# Patient Record
Sex: FEMALE | Race: WHITE | Employment: OTHER | URBAN - METROPOLITAN AREA
[De-identification: names, ages, dates, MRNs, and addresses within clinical notes are randomized per-mention and may not be internally consistent; named-entity substitution may affect disease eponyms.]

---

## 2017-01-19 PROBLEM — R60.0 LOCALIZED EDEMA: Status: ACTIVE | Noted: 2017-01-19

## 2017-01-19 PROBLEM — R07.2 PRECORDIAL PAIN: Status: ACTIVE | Noted: 2017-01-19

## 2017-11-03 PROBLEM — E11.9 DIABETES MELLITUS WITHOUT COMPLICATION (HCC): Status: ACTIVE | Noted: 2017-11-03

## 2018-05-03 PROBLEM — E11.21 TYPE 2 DIABETES WITH NEPHROPATHY (HCC): Status: ACTIVE | Noted: 2018-05-03

## 2018-07-08 ENCOUNTER — APPOINTMENT (OUTPATIENT)
Dept: GENERAL RADIOLOGY | Age: 83
End: 2018-07-08
Attending: EMERGENCY MEDICINE
Payer: MEDICARE

## 2018-07-08 ENCOUNTER — HOSPITAL ENCOUNTER (EMERGENCY)
Age: 83
Discharge: HOME | End: 2018-07-08
Attending: EMERGENCY MEDICINE
Payer: MEDICARE

## 2018-07-08 VITALS
DIASTOLIC BLOOD PRESSURE: 84 MMHG | HEIGHT: 62 IN | WEIGHT: 130 LBS | HEART RATE: 73 BPM | BODY MASS INDEX: 23.92 KG/M2 | TEMPERATURE: 98 F | RESPIRATION RATE: 16 BRPM | SYSTOLIC BLOOD PRESSURE: 125 MMHG | OXYGEN SATURATION: 100 %

## 2018-07-08 DIAGNOSIS — K59.00 CONSTIPATION, UNSPECIFIED CONSTIPATION TYPE: Primary | ICD-10-CM

## 2018-07-08 DIAGNOSIS — J20.9 ACUTE BRONCHITIS, UNSPECIFIED ORGANISM: ICD-10-CM

## 2018-07-08 LAB
ANION GAP SERPL CALC-SCNC: 10 MMOL/L (ref 7–16)
BUN SERPL-MCNC: 16 MG/DL (ref 8–23)
CALCIUM SERPL-MCNC: 9.5 MG/DL (ref 8.3–10.4)
CHLORIDE SERPL-SCNC: 97 MMOL/L (ref 98–107)
CO2 SERPL-SCNC: 28 MMOL/L (ref 21–32)
CREAT SERPL-MCNC: 0.93 MG/DL (ref 0.6–1)
ERYTHROCYTE [DISTWIDTH] IN BLOOD BY AUTOMATED COUNT: 12.2 % (ref 11.9–14.6)
GLUCOSE SERPL-MCNC: 77 MG/DL (ref 65–100)
HCT VFR BLD AUTO: 31.9 % (ref 35.8–46.3)
HGB BLD-MCNC: 10.8 G/DL (ref 11.7–15.4)
MCH RBC QN AUTO: 30.4 PG (ref 26.1–32.9)
MCHC RBC AUTO-ENTMCNC: 33.9 G/DL (ref 31.4–35)
MCV RBC AUTO: 89.9 FL (ref 79.6–97.8)
PLATELET # BLD AUTO: 251 K/UL (ref 150–450)
PMV BLD AUTO: 9.7 FL (ref 10.8–14.1)
POTASSIUM SERPL-SCNC: 3.3 MMOL/L (ref 3.5–5.1)
RBC # BLD AUTO: 3.55 M/UL (ref 4.05–5.25)
SODIUM SERPL-SCNC: 135 MMOL/L (ref 136–145)
WBC # BLD AUTO: 6.7 K/UL (ref 4.3–11.1)

## 2018-07-08 PROCEDURE — 71046 X-RAY EXAM CHEST 2 VIEWS: CPT

## 2018-07-08 PROCEDURE — 99283 EMERGENCY DEPT VISIT LOW MDM: CPT | Performed by: EMERGENCY MEDICINE

## 2018-07-08 PROCEDURE — 74019 RADEX ABDOMEN 2 VIEWS: CPT

## 2018-07-08 PROCEDURE — 80048 BASIC METABOLIC PNL TOTAL CA: CPT | Performed by: EMERGENCY MEDICINE

## 2018-07-08 PROCEDURE — 85027 COMPLETE CBC AUTOMATED: CPT | Performed by: EMERGENCY MEDICINE

## 2018-07-08 RX ORDER — DOXYCYCLINE HYCLATE 100 MG
100 TABLET ORAL 2 TIMES DAILY
Qty: 20 TAB | Refills: 0 | Status: SHIPPED | OUTPATIENT
Start: 2018-07-08 | End: 2018-07-18

## 2018-07-08 RX ORDER — DOXYCYCLINE HYCLATE 100 MG
100 TABLET ORAL 2 TIMES DAILY
Qty: 20 TAB | Refills: 0 | Status: SHIPPED | OUTPATIENT
Start: 2018-07-08 | End: 2018-07-08

## 2018-07-08 RX ORDER — POLYETHYLENE GLYCOL 3350 17 G/17G
17 POWDER, FOR SOLUTION ORAL DAILY
Qty: 238 G | Refills: 0 | Status: SHIPPED | OUTPATIENT
Start: 2018-07-08 | End: 2018-07-08

## 2018-07-08 RX ORDER — POLYETHYLENE GLYCOL 3350 17 G/17G
17 POWDER, FOR SOLUTION ORAL DAILY
Qty: 238 G | Refills: 0 | Status: SHIPPED | OUTPATIENT
Start: 2018-07-08 | End: 2019-01-15

## 2018-07-08 NOTE — ED TRIAGE NOTES
Patient states she has been taking laxatives all week due to constipation and has just been passing \"water\" with no \"real bowel movements\". Patient states she had a small bowel movement one day this week but is unable to remember what day.

## 2018-07-08 NOTE — ED PROVIDER NOTES
HPI Comments: 80-year-old female presents with complaints of constipation. States he is taking laxatives over the last 2 weeks has had some watery bowel movements but no normal bowel movements. She also intermingled complaints of hemoptysis. The hemoptysis seemed to happen just last night. Patient is a nonsmoker. States she's never had issues with hemoptysis in the past.    Patient is a 80 y.o. female presenting with constipation. The history is provided by the patient and the spouse. Constipation    This is a recurrent problem. The current episode started more than 1 week ago. The stool is described as watery. Associated symptoms include abdominal pain, flatus, diarrhea and constipation. Pertinent negatives include no dysuria, no abdominal distention, no chills, no fever, no back pain and no vomiting. She has tried oral laxatives for the symptoms. The treatment provided mild relief. Her past medical history does not include neuromuscular disease, irritable bowel syndrome or endocrine disease. Past Medical History:   Diagnosis Date    Hypertension     Thyroid disease        Past Surgical History:   Procedure Laterality Date    HX ORTHOPAEDIC      right hip    HX OTHER SURGICAL      lung tumor         Family History:   Problem Relation Age of Onset    Heart Disease Mother     Heart Disease Father        Social History     Social History    Marital status:      Spouse name: N/A    Number of children: N/A    Years of education: N/A     Occupational History    Not on file. Social History Main Topics    Smoking status: Never Smoker    Smokeless tobacco: Never Used    Alcohol use No    Drug use: No    Sexual activity: Not on file     Other Topics Concern    Not on file     Social History Narrative         ALLERGIES: Amoxicillin; Penicillins; and Sulfa (sulfonamide antibiotics)    Review of Systems   Constitutional: Negative for chills and fever.    HENT: Negative for congestion, ear pain and rhinorrhea. Eyes: Negative for photophobia and discharge. Respiratory: Positive for cough. Negative for shortness of breath. Cardiovascular: Negative for chest pain and palpitations. Gastrointestinal: Positive for abdominal pain, constipation, diarrhea and flatus. Negative for abdominal distention and vomiting. Endocrine: Negative for cold intolerance and heat intolerance. Genitourinary: Negative for dysuria and flank pain. Musculoskeletal: Negative for arthralgias, back pain, myalgias and neck pain. Skin: Negative for rash and wound. Allergic/Immunologic: Negative for environmental allergies and food allergies. Neurological: Negative for syncope and headaches. Hematological: Negative for adenopathy. Does not bruise/bleed easily. Psychiatric/Behavioral: Negative for dysphoric mood. The patient is not nervous/anxious. All other systems reviewed and are negative. Vitals:    07/08/18 0807   BP: 153/77   Pulse: 75   Resp: 16   Temp: 97.8 °F (36.6 °C)   SpO2: 99%   Weight: 59 kg (130 lb)   Height: 5' 1.5\" (1.562 m)            Physical Exam   Constitutional: She is oriented to person, place, and time. She appears well-developed and well-nourished. She appears distressed. HENT:   Head: Normocephalic and atraumatic. Mouth/Throat: Oropharynx is clear and moist.   Eyes: Conjunctivae and EOM are normal. Pupils are equal, round, and reactive to light. Right eye exhibits no discharge. Left eye exhibits no discharge. No scleral icterus. Neck: Normal range of motion. Neck supple. No thyromegaly present. Cardiovascular: Normal rate, regular rhythm, normal heart sounds and intact distal pulses. Exam reveals no gallop and no friction rub. No murmur heard. Pulmonary/Chest: Effort normal and breath sounds normal. No respiratory distress. She has no wheezes. She exhibits no tenderness. Abdominal: Soft. Bowel sounds are normal. She exhibits no distension.  There is no hepatosplenomegaly. There is no tenderness. There is no rebound and no guarding. No hernia. Musculoskeletal: Normal range of motion. She exhibits no edema or tenderness. Neurological: She is alert and oriented to person, place, and time. No cranial nerve deficit. She exhibits normal muscle tone. Patient is somewhat tangential in her speech. Will often  Not directly answer the question that is posed to her   Skin: Skin is warm and dry. No rash noted. She is not diaphoretic. No erythema. Psychiatric: Her speech is normal and behavior is normal. Judgment and thought content normal. Her mood appears anxious. She exhibits abnormal recent memory. Nursing note and vitals reviewed. MDM  Number of Diagnoses or Management Options  Acute bronchitis, unspecified organism: new and requires workup  Constipation, unspecified constipation type: new and requires workup  Diagnosis management comments: 80-year-old female presents with concerns over constipation. Intermittent over 2 weekshas had flat S is well as some loose watery stools. Also complains of hemoptysis. Single episode occurred last night without recurrence. No fever nonsmoker    At this point, We'll check a CBC to make sure her blood counts are okay. Check chest x-ray to rule out infectious etiology or evidence for malignancy  We'll check abdominal films for evidence of constipation, obstruction or free air       Amount and/or Complexity of Data Reviewed  Clinical lab tests: ordered and reviewed  Tests in the radiology section of CPT®: ordered and reviewed  Tests in the medicine section of CPT®: ordered  Review and summarize past medical records: yes    Risk of Complications, Morbidity, and/or Mortality  Presenting problems: moderate  Diagnostic procedures: moderate  Management options: low  General comments: Elements of this note have been dictated via voice recognition software.   Text and phrases may be limited by the accuracy of the software. The chart has been reviewed, but errors may still be present.       Patient Progress  Patient progress: stable        ED Course       Procedures

## 2018-07-08 NOTE — ED NOTES
I have reviewed discharge instructions with the patient. The patient verbalized understanding. Patient left ED via Discharge Method: wheelchair to Home with  . Opportunity for questions and clarification provided. Patient given 2 scripts. To continue your aftercare when you leave the hospital, you may receive an automated call from our care team to check in on how you are doing. This is a free service and part of our promise to provide the best care and service to meet your aftercare needs.  If you have questions, or wish to unsubscribe from this service please call [phone]. Thank you for Choosing our [facility] Emergency Department.

## 2018-07-08 NOTE — DISCHARGE INSTRUCTIONS
Bronchitis: Care Instructions  Your Care Instructions    Bronchitis is inflammation of the bronchial tubes, which carry air to the lungs. The tubes swell and produce mucus, or phlegm. The mucus and inflamed bronchial tubes make you cough. You may have trouble breathing. Most cases of bronchitis are caused by viruses like those that cause colds. Antibiotics usually do not help and they may be harmful. Bronchitis usually develops rapidly and lasts about 2 to 3 weeks in otherwise healthy people. Follow-up care is a key part of your treatment and safety. Be sure to make and go to all appointments, and call your doctor if you are having problems. It's also a good idea to know your test results and keep a list of the medicines you take. How can you care for yourself at home? · Take all medicines exactly as prescribed. Call your doctor if you think you are having a problem with your medicine. · Get some extra rest.  · Take an over-the-counter pain medicine, such as acetaminophen (Tylenol), ibuprofen (Advil, Motrin), or naproxen (Aleve) to reduce fever and relieve body aches. Read and follow all instructions on the label. · Do not take two or more pain medicines at the same time unless the doctor told you to. Many pain medicines have acetaminophen, which is Tylenol. Too much acetaminophen (Tylenol) can be harmful. · Take an over-the-counter cough medicine that contains dextromethorphan to help quiet a dry, hacking cough so that you can sleep. Avoid cough medicines that have more than one active ingredient. Read and follow all instructions on the label. · Breathe moist air from a humidifier, hot shower, or sink filled with hot water. The heat and moisture will thin mucus so you can cough it out. · Do not smoke. Smoking can make bronchitis worse. If you need help quitting, talk to your doctor about stop-smoking programs and medicines. These can increase your chances of quitting for good.   When should you call for help? Call 911 anytime you think you may need emergency care. For example, call if:  ? · You have severe trouble breathing. ?Call your doctor now or seek immediate medical care if:  ? · You have new or worse trouble breathing. ? · You cough up dark brown or bloody mucus (sputum). ? · You have a new or higher fever. ? · You have a new rash. ? Watch closely for changes in your health, and be sure to contact your doctor if:  ? · You cough more deeply or more often, especially if you notice more mucus or a change in the color of your mucus. ? · You are not getting better as expected. Where can you learn more? Go to http://jonatan-carlo.info/. Enter H333 in the search box to learn more about \"Bronchitis: Care Instructions. \"  Current as of: May 12, 2017  Content Version: 11.4  © 0769-5937 [employer]. Care instructions adapted under license by [employer] (which disclaims liability or warranty for this information). If you have questions about a medical condition or this instruction, always ask your healthcare professional. [place] [address] any warranty or liability for your use of this information. Constipation: Care Instructions  Your Care Instructions    Constipation means that you have a hard time passing stools (bowel movements). People pass stools from 3 times a day to once every 3 days. What is normal for you may be different. Constipation may occur with pain in the rectum and cramping. The pain may get worse when you try to pass stools. Sometimes there are small amounts of bright red blood on toilet paper or the surface of stools. This is because of enlarged veins near the rectum (hemorrhoids). A few changes in your diet and lifestyle may help you avoid ongoing constipation. Your doctor may also prescribe medicine to help loosen your stool. Some medicines can cause constipation.  These include pain medicines and antidepressants. Tell your doctor about all the medicines you take. Your doctor may want to make a medicine change to ease your symptoms. Follow-up care is a key part of your treatment and safety. Be sure to make and go to all appointments, and call your doctor if you are having problems. It's also a good idea to know your test results and keep a list of the medicines you take. How can you care for yourself at home? · Drink plenty of fluids, enough so that your urine is light yellow or clear like water. If you have kidney, heart, or liver disease and have to limit fluids, talk with your doctor before you increase the amount of fluids you drink. · Include high-fiber foods in your diet each day. These include fruits, vegetables, beans, and whole grains. · Get at least 30 minutes of exercise on most days of the week. Walking is a good choice. You also may want to do other activities, such as running, swimming, cycling, or playing tennis or team sports. · Take a fiber supplement, such as Citrucel or Metamucil, every day. Read and follow all instructions on the label. · Schedule time each day for a bowel movement. A daily routine may help. Take your time having your bowel movement. · Support your feet with a small step stool when you sit on the toilet. This helps flex your hips and places your pelvis in a squatting position. · Your doctor may recommend an over-the-counter laxative to relieve your constipation. Examples are Milk of Magnesia and MiraLax. Read and follow all instructions on the label. Do not use laxatives on a long-term basis. When should you call for help? Call your doctor now or seek immediate medical care if:  ? · You have new or worse belly pain. ? · You have new or worse nausea or vomiting. ? · You have blood in your stools. ? Watch closely for changes in your health, and be sure to contact your doctor if:  ? · Your constipation is getting worse. ? · You do not get better as expected. Where can you learn more? Go to http://jonatan-carlo.info/. Enter 21  in the search box to learn more about \"Constipation: Care Instructions. \"  Current as of: March 20, 2017  Content Version: 11.4  © 7557-3678 [employer]. Care instructions adapted under license by [employer] (which disclaims liability or warranty for this information). If you have questions about a medical condition or this instruction, always ask your healthcare professional. [address] any warranty or liability for your use of this information.

## 2018-08-21 ENCOUNTER — HOSPITAL ENCOUNTER (OUTPATIENT)
Dept: MAMMOGRAPHY | Age: 83
Discharge: HOME | End: 2018-08-21
Attending: ORTHOPAEDIC SURGERY
Payer: MEDICARE

## 2018-08-21 DIAGNOSIS — M12.532 TRAUMATIC ARTHROPATHY, LEFT WRIST: ICD-10-CM

## 2018-08-21 DIAGNOSIS — M25.562 PAIN IN LEFT KNEE: ICD-10-CM

## 2018-08-21 DIAGNOSIS — M81.0 AGE-RELATED OSTEOPOROSIS WITHOUT CURRENT PATHOLOGICAL FRACTURE: ICD-10-CM

## 2018-08-21 DIAGNOSIS — M25.552 HIP PAIN, LEFT: ICD-10-CM

## 2018-08-21 DIAGNOSIS — Z78.0 ASYMPTOMATIC MENOPAUSAL STATE: ICD-10-CM

## 2018-08-21 DIAGNOSIS — G56.02 CARPAL TUNNEL SYNDROME, LEFT UPPER LIMB: ICD-10-CM

## 2018-08-21 PROCEDURE — 77080 DXA BONE DENSITY AXIAL: CPT

## 2018-09-25 ENCOUNTER — HOSPITAL ENCOUNTER (OUTPATIENT)
Dept: SURGERY | Age: 83
Discharge: HOME | End: 2018-09-25
Payer: MEDICARE

## 2018-09-25 ENCOUNTER — HOSPITAL ENCOUNTER (OUTPATIENT)
Dept: PHYSICAL THERAPY | Age: 83
Discharge: HOME | End: 2018-09-25
Payer: MEDICARE

## 2018-09-25 VITALS
TEMPERATURE: 97.9 F | BODY MASS INDEX: 19.97 KG/M2 | OXYGEN SATURATION: 100 % | DIASTOLIC BLOOD PRESSURE: 74 MMHG | HEART RATE: 64 BPM | WEIGHT: 124.25 LBS | RESPIRATION RATE: 14 BRPM | HEIGHT: 66 IN | SYSTOLIC BLOOD PRESSURE: 142 MMHG

## 2018-09-25 LAB
ALBUMIN SERPL-MCNC: 4.2 G/DL (ref 3.2–4.6)
ANION GAP SERPL CALC-SCNC: 6 MMOL/L
APPEARANCE UR: CLEAR
APTT PPP: 27.1 SEC (ref 23.2–35.3)
BACTERIA SPEC CULT: NORMAL
BACTERIA URNS QL MICRO: 0 /HPF
BILIRUB UR QL: NEGATIVE
BUN SERPL-MCNC: 17 MG/DL (ref 8–23)
CALCIUM SERPL-MCNC: 9.4 MG/DL (ref 8.3–10.4)
CASTS URNS QL MICRO: ABNORMAL /LPF
CHLORIDE SERPL-SCNC: 94 MMOL/L (ref 98–107)
CO2 SERPL-SCNC: 31 MMOL/L (ref 21–32)
COLOR UR: YELLOW
CREAT SERPL-MCNC: 0.92 MG/DL (ref 0.6–1)
EPI CELLS #/AREA URNS HPF: ABNORMAL /HPF
ERYTHROCYTE [DISTWIDTH] IN BLOOD BY AUTOMATED COUNT: 12.5 %
EST. AVERAGE GLUCOSE BLD GHB EST-MCNC: 114 MG/DL
GLUCOSE SERPL-MCNC: 87 MG/DL (ref 65–100)
GLUCOSE UR STRIP.AUTO-MCNC: NEGATIVE MG/DL
HBA1C MFR BLD: 5.6 %
HCT VFR BLD AUTO: 31.7 % (ref 35.8–46.3)
HGB BLD-MCNC: 10.4 G/DL (ref 11.7–15.4)
HGB UR QL STRIP: ABNORMAL
INR PPP: 1
KETONES UR QL STRIP.AUTO: NEGATIVE MG/DL
LEUKOCYTE ESTERASE UR QL STRIP.AUTO: NEGATIVE
MCH RBC QN AUTO: 30.3 PG (ref 26.1–32.9)
MCHC RBC AUTO-ENTMCNC: 32.8 G/DL (ref 31.4–35)
MCV RBC AUTO: 92.4 FL (ref 79.6–97.8)
NITRITE UR QL STRIP.AUTO: NEGATIVE
NRBC # BLD: 0 K/UL (ref 0–0.2)
PH UR STRIP: 7 [PH] (ref 5–9)
PLATELET # BLD AUTO: 235 K/UL (ref 150–450)
PMV BLD AUTO: 9.6 FL (ref 9.4–12.3)
POTASSIUM SERPL-SCNC: 3.6 MMOL/L (ref 3.5–5.1)
PROT UR STRIP-MCNC: NEGATIVE MG/DL
PROTHROMBIN TIME: 13.2 SEC (ref 11.5–14.5)
RBC # BLD AUTO: 3.43 M/UL (ref 4.05–5.2)
RBC #/AREA URNS HPF: ABNORMAL /HPF
SERVICE CMNT-IMP: NORMAL
SODIUM SERPL-SCNC: 131 MMOL/L (ref 136–145)
SP GR UR REFRACTOMETRY: 1.01 (ref 1–1.02)
UROBILINOGEN UR QL STRIP.AUTO: 0.2 EU/DL (ref 0.2–1)
WBC # BLD AUTO: 6.6 K/UL (ref 4.3–11.1)
WBC URNS QL MICRO: ABNORMAL /HPF

## 2018-09-25 PROCEDURE — 85027 COMPLETE CBC AUTOMATED: CPT

## 2018-09-25 PROCEDURE — 83036 HEMOGLOBIN GLYCOSYLATED A1C: CPT

## 2018-09-25 PROCEDURE — 81001 URINALYSIS AUTO W/SCOPE: CPT

## 2018-09-25 PROCEDURE — 80307 DRUG TEST PRSMV CHEM ANLYZR: CPT

## 2018-09-25 PROCEDURE — G8978 MOBILITY CURRENT STATUS: HCPCS

## 2018-09-25 PROCEDURE — G8979 MOBILITY GOAL STATUS: HCPCS

## 2018-09-25 PROCEDURE — 87641 MR-STAPH DNA AMP PROBE: CPT

## 2018-09-25 PROCEDURE — 85610 PROTHROMBIN TIME: CPT

## 2018-09-25 PROCEDURE — 85730 THROMBOPLASTIN TIME PARTIAL: CPT

## 2018-09-25 PROCEDURE — 97161 PT EVAL LOW COMPLEX 20 MIN: CPT

## 2018-09-25 PROCEDURE — 77030027138 HC INCENT SPIROMETER -A

## 2018-09-25 PROCEDURE — 36415 COLL VENOUS BLD VENIPUNCTURE: CPT

## 2018-09-25 PROCEDURE — 82040 ASSAY OF SERUM ALBUMIN: CPT

## 2018-09-25 PROCEDURE — 80048 BASIC METABOLIC PNL TOTAL CA: CPT

## 2018-09-25 PROCEDURE — G8980 MOBILITY D/C STATUS: HCPCS

## 2018-09-25 RX ORDER — CHOLECALCIFEROL (VITAMIN D3) 125 MCG
CAPSULE ORAL AS NEEDED
COMMUNITY
End: 2019-01-06

## 2018-09-25 NOTE — PROGRESS NOTES
Patti Morin : 1932(85 y.o.) [address] at [address], [address] [place] [address] [phone] Physical Therapy Prehab Plan of Treatment and Evaluation Summary:2018 ICD-10: Treatment Diagnosis:  
· Pain in left hip (M25.552) · Stiffness of Left Hip, Not elsewhere classified (M25.652) · Difficulty in walking, Not elsewhere classified (R26.2) · Other abnormalities of gait and mobility (R26.89) Precautions/Allergies:  
Amoxicillin; Penicillins; and Sulfa (sulfonamide antibiotics)  MEDICAL/REFERRING DIAGNOSIS: 
Unilateral primary osteoarthritis, left knee [M17.12] REFERRING PHYSICIAN: Emerald Mehta MD 
DATE OF SURGERY: 10/8/18 Assessment:  
Comments:  Pain in left hip joint with decreased independence with functional mobility. Pt plans to go home versus rehab following hospital stay. Pt had left hip replacement in . Pt's daughter and pt's spouse present and supportive. Daughter appears to be instrumental in pt be scheduled for surgery. PROBLEM LIST (Impacting functional limitations): Ms. Arlene Awad presents with the following left lower extremity(s) problems: 1. Transfers 2. Gait 3. Strength 4. Range of Motion 5. Pain INTERVENTIONS PLANNED: 
1. Home Exercise Program 
2. Educational Discussion TREATMENT PLAN: Effective Dates: 2018 TO 2018. Frequency/Duration: Patient to continue to perform home exercise program at least twice per day up until her surgery. GOALS: (Goals have been discussed and agreed upon with patient.) Discharge Goals: Time Frame: 1 Day 1. Patient will demonstrate independence with a home exercise program designed to increase strength, range of motion and pain control to minimize functional deficits and optimize patient for total joint replacement. Rehabilitation Potential For Stated Goals: Good Regarding Authur Melvin Brown's therapy, I certify that the treatment plan above will be carried out by a therapist or under their direction. Thank you for this referral, Fletcher Hinton, PT     
    
 
 
HISTORY:  
Present Symptoms: 
Pain Intensity 1: 1 Pain Location 1: Hip Pain Orientation 1: Left History of Present Injury/Illness (Reason for Referral): 
Medical/Referring Diagnosis: Unilateral primary osteoarthritis, left knee [M17.12] Past Medical History/Comorbidities:  
Ms. Ra Doe  has a past medical history of Anxiety; Decreased mobility; Edema; GERD (gastroesophageal reflux disease); [place] (hard of hearing); echocardiogram (01/04/2017); Hyperlipidemia; Hypertension; Hypothyroid; Osteoarthritis; and Pneumonia (2003). Ms. Ra Doe  has a past surgical history that includes hx other surgical (2013) and hx hip replacement (Right, 2003). Social History/Living Environment:  
Home Environment: Private residence # Steps to Enter: 3 Rails to Enter: Yes One/Two Story Residence: One story Living Alone: No 
Support Systems: Spouse/Significant Other/Partner Patient Expects to be Discharged to[de-identified] Private residence Current DME Used/Available at Home: Wheelchair, Commode, bedside, [employer], straight, Walker Tub or Shower Type: Tub/Shower combination Work/Activity: 
retired Dominant Side: 
RIGHT Current Medications:  See Pre-assessment nursing note Number of Personal Factors/Comorbidities that affect the Plan of Care: 0: LOW COMPLEXITY EXAMINATION:  
ADLs (Current Functional Status):  
Ambulation: 
[] Independent 
[] Walk Indoors Only 
[] Walk Outdoors [x] Use Assistive Device [] Use Wheelchair Only Dressing: 
[x] Independent Requires Assistance from Someone for: 
[] Sock/Shoes 
[] Pants 
[] Everything Bathing/Showering:  
[] Independent [x] Requires Assistance from Someone 
[] Sponge Bath Only Household Activities: 
[] Routine house and yard work 
[] Light Housework Only 
[x] None Observation/Orthostatic Postural Assessment:  
Within defined limits ROM/Flexibility: AROM: Generally decreased, functional 
 
  
  
  
  
LLE AROM 
L Hip Flexion: 90   
 
RLE Assessment RLE Assessment (WDL): Within defined limits Strength:  
Strength: Generally decreased, functional 
 
  
    
 
   
Functional Mobility:   
Coordination: Generally decreased, functional 
 
Gait Description (WDL): Within defined limits Stand to Sit: Stand-by assistance Sit to Stand: Stand-by assistance Distance (ft): 10 Feet (ft) Ambulation - Level of Assistance: Minimal assistance Gait Abnormalities: Antalgic Balance:   
Sitting: Intact Standing: Pull to stand, With support Body Structures Involved: 1. Bones 2. Joints 3. Muscles 4. Ligaments Body Functions Affected: 1. Neuromusculoskeletal 
2. Movement Related Activities and Participation Affected: 1. Mobility Number of elements that affect the Plan of Care: 4+: HIGH COMPLEXITY CLINICAL PRESENTATION:  
Presentation: Stable and uncomplicated: LOW COMPLEXITY CLINICAL DECISION MAKING:  
Outcome Measure: Tool Used: Lower Extremity Functional Scale (LEFS) Score:  Initial: 4/80 Most Recent: X/80 (Date: -- ) Interpretation of Score: 20 questions each scored on a 5 point scale with 0 representing \"extreme difficulty or unable to perform\" and 4 representing \"no difficulty\". The lower the score, the greater the functional disability. 80/80 represents no disability. Minimal detectable change is 9 points. Score 80 79-65 64-49 48-33 32-17 16-1 0 Modifier CH CI CJ CK CL CM CN  
 
? Mobility - Walking and Moving Around:  
  - CURRENT STATUS: CM - 80%-99% impaired, limited or restricted  - GOAL STATUS: CM - 80%-99% impaired, limited or restricted  - D/C STATUS:  CM - 80%-99% impaired, limited or restricted Medical Necessity:  
· Ms. Westley Thayer is expected to optimize her lower extremity strength and ROM in preparation for joint replacement surgery. Reason for Services/Other Comments: · Achieve baseline assesment of musculoskeletal system, functional mobility and home environment. , educate in PT HEP in preparation for surgery, educate in hospital plan of care. Use of outcome tool(s) and clinical judgement create a POC that gives a: Clear prediction of patient's progress: LOW COMPLEXITY  
TREATMENT:  
Treatment/Session Assessment:  Patient was instructed in PT- HEP to increase strength and ROM in LEs. Answered all questions. · Post session pain:  1 
· Compliance with Program/Exercises: compliant most of the time. Total Treatment Duration: PT Patient Time In/Time Out Time In: 1030 Time Out: 1100 Niurka Dillon, PT

## 2018-09-25 NOTE — PROGRESS NOTES
09/25/18 1030 Oxygen Therapy O2 Sat (%) 99 % Pulse via Oximetry 74 beats per minute O2 Device Room air Pre-Treatment Breath Sounds Bilateral Clear Pre FEV1 (liters) 1.6 liters % Predicted 93 Incentive Spirometry Treatment Actual Volume (ml) 1500 ml Initial respiratory Assessment completed with pt. Pt was interviewed and evaluated in Joint camp prior to surgery. Patient ID: 
Hipolito Ron [id number] 
80 y.o. 
11/11/1932 Surgeon: Dr. Raciel Mena Date of Surgery: 10/8/2018 Procedure: Total Left Hip Arthroplasty Primary Care Physician: Macie Rodriguez [phone] Specialists:  
                    
          Pt instructed in the use of Incentive Spirometry. Pt instructed to bring Incentive Spirometer back on date of surgery & to start using Is upon return to pt room. Pt taught proper cough technique History of smoking:   NONE Quit date:        
 
Secondhand smoke:FATHER Past procedures with Oxygen desaturation:NONE Past Medical History:  
Diagnosis Date  Anxiety   
 managed with medication  Decreased mobility   
 using rollator and wheelchair  Edema   
 bilateral feet/ankles (takes lasix PRN)  GERD (gastroesophageal reflux disease)   
 managed with medication  [place] (hard of hearing) does not wear hearing aids  Hx of echocardiogram 01/04/2017 EF 60.8%, mild aortic valve sclerosis, mild AR, mild MR  
 Hyperlipidemia  Hypertension   
 managed with medication  Hypothyroid   
 managed with medication  Osteoarthritis  Pneumonia 2003 7/1/2013 PARTIAL LOWER LOBE RESECTION , AMYLOIDOSIS, LUNG NODULES, HX OF PNA Respiratory history: SOB  ON EXERTION Respiratory meds:  NA 
 
 
 FAMILY PRESENT:            SPOUSE AND GRAND DAUGHTER, PAST SLEEP STUDY:                        NO 
HX OF JORDEN:                                           NO JORDEN assessment: PHYSICAL EXAM  
Body mass index is 20.05 kg/(m^2). Visit Vitals  /74 (BP 1 Location: Left arm, BP Patient Position: At rest;Sitting)  Pulse 64  Temp 97.9 °F (36.6 °C)  Resp 14  
 Ht 5' 6\" (1.676 m)  Wt 56.4 kg (124 lb 4 oz)  SpO2 100%  BMI 20.05 kg/m2 Neck circumference:  33.5    cm Loud snoring: DENIES Witnessed apnea or wakening gasping or choking:,             DENIES, Awakens with headaches:                                                  DENIES Morning or daytime tiredness/ sleepiness:                    DENIES Dry mouth or sore throat in morning: DENIES Stacy Wilkinson stage:  2 SACS score:4 
 
STOP/BAN 
 
                         
CPAP:                       NONE 
                                
 
 
 
 
     CONT SAT HS Referrals: 
 
Pt. Phone Number:

## 2018-09-25 NOTE — PERIOP NOTES
Lab results within anesthesia guidelines; MRSA swab result and Nicotine result pending. All results sent to pt's PCP, Lissette Up MD, per surgeon's order. Recent Results (from the past 12 hour(s)) CBC W/O DIFF Collection Time: 09/25/18 11:20 AM  
Result Value Ref Range WBC 6.6 4.3 - 11.1 K/uL  
 RBC 3.43 (L) 4.05 - 5.2 M/uL  
 HGB 10.4 (L) 11.7 - 15.4 g/dL HCT 31.7 (L) 35.8 - 46.3 % MCV 92.4 79.6 - 97.8 FL  
 MCH 30.3 26.1 - 32.9 PG  
 MCHC 32.8 31.4 - 35.0 g/dL  
 RDW 12.5 % PLATELET 832 207 - 412 K/uL MPV 9.6 9.4 - 12.3 FL ABSOLUTE NRBC 0.00 0.0 - 0.2 K/uL METABOLIC PANEL, BASIC Collection Time: 09/25/18 11:20 AM  
Result Value Ref Range Sodium 131 (L) 136 - 145 mmol/L Potassium 3.6 3.5 - 5.1 mmol/L Chloride 94 (L) 98 - 107 mmol/L  
 CO2 31 21 - 32 mmol/L Anion gap 6 mmol/L Glucose 87 65 - 100 mg/dL BUN 17 8 - 23 MG/DL Creatinine 0.92 0.6 - 1.0 MG/DL  
 GFR est AA >60 >60 ml/min/1.73m2 GFR est non-AA >60 ml/min/1.73m2 Calcium 9.4 8.3 - 10.4 MG/DL PROTHROMBIN TIME + INR Collection Time: 09/25/18 11:20 AM  
Result Value Ref Range Prothrombin time 13.2 11.5 - 14.5 sec INR 1.0    
PTT Collection Time: 09/25/18 11:20 AM  
Result Value Ref Range aPTT 27.1 23.2 - 35.3 SEC URINALYSIS W/ RFLX MICROSCOPIC Collection Time: 09/25/18 11:20 AM  
Result Value Ref Range Color YELLOW Appearance CLEAR Specific gravity 1.011 1.001 - 1.023    
 pH (UA) 7.0 5.0 - 9.0 Protein NEGATIVE  NEG mg/dL Glucose NEGATIVE  mg/dL Ketone NEGATIVE  NEG mg/dL Bilirubin NEGATIVE  NEG Blood TRACE (A) NEG Urobilinogen 0.2 0.2 - 1.0 EU/dL Nitrites NEGATIVE  NEG Leukocyte Esterase NEGATIVE  NEG    
 WBC 0-3 0 /hpf  
 RBC 5-10 0 /hpf Epithelial cells 0-3 0 /hpf Bacteria 0 0 /hpf Casts 0-3 0 /lpf HEMOGLOBIN A1C WITH EAG Collection Time: 09/25/18 11:20 AM  
Result Value Ref Range Hemoglobin A1c 5.6 % Est. average glucose 114 mg/dL ALBUMIN Collection Time: 09/25/18 11:20 AM  
Result Value Ref Range Albumin 4.2 3.2 - 4.6 g/dL

## 2018-09-25 NOTE — PERIOP NOTES
Patient verified name, , and surgery as listed in [facility]. Type 3 surgery, joint PAT assessment complete. Labs per surgeon: CBC, BMP, Albumin, Hgba1c, PT/PTT, UA, Nicotine and MRSA swab collected Labs per anesthesia protocol: no additional labs needed EKG: done 9/10/18- result sinus rhythm and within anesthesia guidelines- tracing in EMR for anesthesia reference Pt followed by [facility]iology- office note with clearance 9/10/18 states, \"Preoperative clearance: Moderate cv risk due to age- normal stress test and normal lvef on echo in the past. Can walk up flight of stair before hip pain worsened. \" ECHO 17 and stress test 16 in EMR for anesthesia reference. Hibiclens and instructions to return bottle on DOS given per hospital policy. Nasal Swab collected per MD order and instructions for Mupirocin nasal ointment if required. Patient provided with handouts including Guide to Surgery, Pain Management, Hand Hygiene, Blood Transfusion Education, and [place] [facility] Brochure. Patient answered medical/surgical history questions at their best of ability. All prior to admission medications documented in [facility] Care. Original medication prescription bottles (most) visualized during patient appointment. Patient instructed to hold all vitamins 7 days prior to surgery and NSAIDS 5 days prior to surgery. Medications to be held: aleve, diclofenac gel, vitamins. Patient instructed to continue previous medications as prescribed prior to surgery and to take the following medications the day of surgery according to anesthesia guidelines with a small sip of water: synthroid, ativan, omeprazole, zoloft. Patient teach back successful and patient demonstrates knowledge of instruction.

## 2018-09-26 LAB
COTININE UR QL SCN: NEGATIVE NG/ML
DRUG SCREEN COMMENT:, 753798: NORMAL

## 2018-09-26 NOTE — PERIOP NOTES
9/26/2018 12:37 PM - Kevin, Lab In Mobile Backstage  
Component Results Component Value Flag Ref Range Units Status Cotinine Screen, urine NEGATIVE   Rgkkei=297 ng/mL Final

## 2018-09-26 NOTE — ADVANCED PRACTICE NURSE
Total Joint Surgery Preoperative Chart Review Patient ID: 
Leonidas Ambrosio [id number] 
80 y.o. 
11/11/1932 Surgeon: Dr. Angelique Ward Date of Surgery: 10/8/2018 Procedure: Total Left Hip Arthroplasty Primary Care Physician: Alton Yarbrough [phone] Specialty Physician(s):   
 
Subjective:  
Leonidas Ambrosio is a 80 y.o. WHITE OR  female who presents for preoperative evaluation for Total Left Hip arthroplasty. This is a preoperative chart review note based on data collected by the nurse at the surgical Pre-Assessment visit. Past Medical History:  
Diagnosis Date  Anxiety   
 managed with medication  Decreased mobility   
 using rollator and wheelchair  Edema   
 bilateral feet/ankles (takes lasix PRN)  GERD (gastroesophageal reflux disease)   
 managed with medication  [place] (hard of hearing) does not wear hearing aids  Hx of echocardiogram 01/04/2017 EF 60.8%, mild aortic valve sclerosis, mild AR, mild MR  
 Hyperlipidemia  Hypertension   
 managed with medication  Hypothyroid   
 managed with medication  Osteoarthritis  Pneumonia 2003 Past Surgical History:  
Procedure Laterality Date  HX HIP REPLACEMENT Right 2003  HX OTHER SURGICAL  2013  
 lung tumor Family History Problem Relation Age of Onset  Heart Disease Mother  Heart Disease Father Social History Substance Use Topics  Smoking status: Never Smoker  Smokeless tobacco: Never Used  Alcohol use No  
   
Prior to Admission medications Medication Sig Start Date End Date Taking? Authorizing Provider  
naproxen sodium (ALEVE) 220 mg cap Take  by mouth as needed. Yes Historical Provider LORazepam (ATIVAN) 1 mg tablet Take 1 Tab by mouth daily as needed for Anxiety. 8/1/18  Yes Alton Yarbrough MD  
sertraline (ZOLOFT) 100 mg tablet Take 1 Tab by mouth daily. Patient taking differently: Take 50 mg by mouth daily.  8/1/18  Yes Alton Yarbrough MD  
 omeprazole (PRILOSEC) 40 mg capsule Take 1 Cap by mouth daily. 8/1/18  Yes Jose Antonio Cueto MD  
simvastatin (ZOCOR) 40 mg tablet TAKE 1 TABLET BY MOUTH EVERY NIGHT 8/1/18  Yes Jose Antonio Cueto MD  
losartan-hydroCHLOROthiazide [facility]) 50-12.5 mg per tablet Take 1 Tab by mouth daily. 8/1/18  Yes Jose Antonio Cueto MD  
furosemide (LASIX) 20 mg tablet TAKE 1 TABLET BY MOUTH DAILY FOR 3 DAYS OR UNTIL SWELLING IS GONE FROM BOTH LEGS 8/1/18  Yes Jose Antonio Cueto MD  
levothyroxine (SYNTHROID) 100 mcg tablet TAKE 1 TABLET BY MOUTH DAILY BEFORE BREAKFAST 8/1/18  Yes Jose Antonio Cueto MD  
polyethylene glycol [facility]) 17 gram/dose powder Take 17 g by mouth daily. 1 tablespoon with 8 oz of water daily Patient taking differently: Take 17 g by mouth as needed. 1 tablespoon with 8 oz of water daily 7/8/18  Yes Castro Reese MD  
levothyroxine (SYNTHROID) 100 mcg tablet Take 1 Tab by mouth Daily (before breakfast). 5/3/18  Yes Jose Antonio Cueto MD  
potassium chloride (KLOR-CON) 10 mEq tablet TAKE 1 TABLET BY MOUTH TWICE DAILY ONLY WHEN TAKING LASIX 5/3/18  Yes Jose Antonio Cueto MD  
diclofenac (VOLTAREN) 1 % gel Apply 4 g to affected area four (4) times daily. For knee pain 5/3/18  Yes Jose Antonio Cueto MD  
cyanocobalamin (VITAMIN B12) 500 mcg tablet Take 500 mcg by mouth daily. Yes Historical Provider  
miscellaneous medical supply misc Wheelchair, mechanical for ambulation, hip pain, poor ambulation 8/1/18   Jose Antonio Cueto MD  
[facility] Handicap Placard - use for poor ambulation. 8/1/18   Jose Antonio Cueto MD  
lactase (LACTAID) 3,000 unit tablet Pt may use up to three times daily as needed with meals. Patient not taking: Reported on 9/25/2018 5/3/18   Jose Antonio Cueto MD  
[facility] misc Zippered pair of compression stocking for lower extremity edema. Pt says cannot get on other type. (1 pair) 7/5/17   Jose Antonio Cueto MD  
 
Allergies Allergen Reactions  Amoxicillin Rash  Penicillins Other (comments) \"Mouth breaks out\"  Sulfa (Sulfonamide Antibiotics) Other (comments) Objective:  
 
Physical Exam:  
Patient Vitals for the past 24 hrs: 
 Temp Pulse Resp BP SpO2  
09/25/18 1210 97.9 °F (36.6 °C) 64 14 142/74 100 % ECG:   
EKG Results None Data Review:  
Labs:  
Recent Results (from the past 24 hour(s)) CBC W/O DIFF Collection Time: 09/25/18 11:20 AM  
Result Value Ref Range WBC 6.6 4.3 - 11.1 K/uL  
 RBC 3.43 (L) 4.05 - 5.2 M/uL  
 HGB 10.4 (L) 11.7 - 15.4 g/dL HCT 31.7 (L) 35.8 - 46.3 % MCV 92.4 79.6 - 97.8 FL  
 MCH 30.3 26.1 - 32.9 PG  
 MCHC 32.8 31.4 - 35.0 g/dL  
 RDW 12.5 % PLATELET 896 942 - 319 K/uL MPV 9.6 9.4 - 12.3 FL ABSOLUTE NRBC 0.00 0.0 - 0.2 K/uL METABOLIC PANEL, BASIC Collection Time: 09/25/18 11:20 AM  
Result Value Ref Range Sodium 131 (L) 136 - 145 mmol/L Potassium 3.6 3.5 - 5.1 mmol/L Chloride 94 (L) 98 - 107 mmol/L  
 CO2 31 21 - 32 mmol/L Anion gap 6 mmol/L Glucose 87 65 - 100 mg/dL BUN 17 8 - 23 MG/DL Creatinine 0.92 0.6 - 1.0 MG/DL  
 GFR est AA >60 >60 ml/min/1.73m2 GFR est non-AA >60 ml/min/1.73m2 Calcium 9.4 8.3 - 10.4 MG/DL PROTHROMBIN TIME + INR Collection Time: 09/25/18 11:20 AM  
Result Value Ref Range Prothrombin time 13.2 11.5 - 14.5 sec INR 1.0    
PTT Collection Time: 09/25/18 11:20 AM  
Result Value Ref Range aPTT 27.1 23.2 - 35.3 SEC URINALYSIS W/ RFLX MICROSCOPIC Collection Time: 09/25/18 11:20 AM  
Result Value Ref Range Color YELLOW Appearance CLEAR Specific gravity 1.011 1.001 - 1.023    
 pH (UA) 7.0 5.0 - 9.0 Protein NEGATIVE  NEG mg/dL Glucose NEGATIVE  mg/dL Ketone NEGATIVE  NEG mg/dL Bilirubin NEGATIVE  NEG Blood TRACE (A) NEG Urobilinogen 0.2 0.2 - 1.0 EU/dL Nitrites NEGATIVE  NEG Leukocyte Esterase NEGATIVE  NEG    
 WBC 0-3 0 /hpf  
 RBC 5-10 0 /hpf Epithelial cells 0-3 0 /hpf  Bacteria 0 0 /hpf  
 Casts 0-3 0 /lpf HEMOGLOBIN A1C WITH EAG Collection Time: 09/25/18 11:20 AM  
Result Value Ref Range Hemoglobin A1c 5.6 % Est. average glucose 114 mg/dL ALBUMIN Collection Time: 09/25/18 11:20 AM  
Result Value Ref Range Albumin 4.2 3.2 - 4.6 g/dL MSSA/MRSA SC BY PCR, NASAL SWAB Collection Time: 09/25/18 12:42 PM  
Result Value Ref Range Special Requests: NO SPECIAL REQUESTS Culture result:     
  SA target not detected. A MRSA NEGATIVE, SA NEGATIVE test result does not preclude MRSA or SA nasal colonization. Problem List: 
) Patient Active Problem List  
Diagnosis Code  Anxiety and depression F41.9, F32.9  Hyponatremia E87.1  Hypokalemia E87.6  Vitamin B 12 deficiency E53.8  Hyperlipidemia E78.5  Other vitamin B12 deficiency anemia D51.8  Decreased ambulation status R68.89  Chronic low back pain with sciatica M54.40, G89.29  
 Poor tolerance for ambulation Z78.9  Arthralgia M25.50  Bilateral edema of lower extremity R60.0  Precordial pain R07.2  Localized edema R60.0  Diabetes mellitus without complication ([address] C21.0  Type 2 diabetes with nephropathy (HCC) E11.21 Total Joint Surgery Pre-Assessment Recommendations:          
Recommend continuous saturation monitoring hours of sleep, during hospitalization. Signed By: KENNETH Hill September 26, 2018

## 2018-10-04 RX ORDER — SODIUM CHLORIDE 0.9 % (FLUSH) 0.9 %
5-10 SYRINGE (ML) INJECTION EVERY 8 HOURS
Status: CANCELLED | OUTPATIENT
Start: 2018-10-04

## 2018-10-07 ENCOUNTER — ANESTHESIA EVENT (OUTPATIENT)
Dept: SURGERY | Age: 83
DRG: 470 | End: 2018-10-07
Payer: MEDICARE

## 2018-10-08 ENCOUNTER — HOSPITAL ENCOUNTER (INPATIENT)
Age: 83
LOS: 2 days | Discharge: HOME HEALTH | DRG: 470 | End: 2018-10-10
Attending: ORTHOPAEDIC SURGERY | Admitting: ORTHOPAEDIC SURGERY
Payer: MEDICARE

## 2018-10-08 ENCOUNTER — APPOINTMENT (OUTPATIENT)
Dept: GENERAL RADIOLOGY | Age: 83
DRG: 470 | End: 2018-10-08
Attending: ORTHOPAEDIC SURGERY
Payer: MEDICARE

## 2018-10-08 ENCOUNTER — HOME HEALTH ADMISSION (OUTPATIENT)
Dept: HOME HEALTH SERVICES | Facility: HOME HEALTH | Age: 83
End: 2018-10-08
Payer: MEDICARE

## 2018-10-08 ENCOUNTER — ANESTHESIA (OUTPATIENT)
Dept: SURGERY | Age: 83
DRG: 470 | End: 2018-10-08
Payer: MEDICARE

## 2018-10-08 DIAGNOSIS — M16.12 PRIMARY OSTEOARTHRITIS OF LEFT HIP: Primary | ICD-10-CM

## 2018-10-08 PROBLEM — M16.9 OA (OSTEOARTHRITIS) OF HIP: Status: ACTIVE | Noted: 2018-10-08

## 2018-10-08 LAB
ABO + RH BLD: NORMAL
BLOOD GROUP ANTIBODIES SERPL: NORMAL
GLUCOSE BLD STRIP.AUTO-MCNC: 77 MG/DL (ref 65–100)
HGB BLD-MCNC: 8.7 G/DL (ref 11.7–15.4)
SPECIMEN EXP DATE BLD: NORMAL

## 2018-10-08 PROCEDURE — 77030006777 HC BLD SAW OSC CNMD -B: Performed by: ORTHOPAEDIC SURGERY

## 2018-10-08 PROCEDURE — 77030020263 HC SOL INJ SOD CL0.9% LFCR 1000ML

## 2018-10-08 PROCEDURE — 86901 BLOOD TYPING SEROLOGIC RH(D): CPT

## 2018-10-08 PROCEDURE — 77030033067 HC SUT PDO STRATFX SPIR J&J -B: Performed by: ORTHOPAEDIC SURGERY

## 2018-10-08 PROCEDURE — 76210000016 HC OR PH I REC 1 TO 1.5 HR: Performed by: ORTHOPAEDIC SURGERY

## 2018-10-08 PROCEDURE — 77030002933 HC SUT MCRYL J&J -A: Performed by: ORTHOPAEDIC SURGERY

## 2018-10-08 PROCEDURE — 65270000029 HC RM PRIVATE

## 2018-10-08 PROCEDURE — 77030019908 HC STETH ESOPH SIMS -A: Performed by: ANESTHESIOLOGY

## 2018-10-08 PROCEDURE — 72170 X-RAY EXAM OF PELVIS: CPT

## 2018-10-08 PROCEDURE — 82962 GLUCOSE BLOOD TEST: CPT

## 2018-10-08 PROCEDURE — 74011000250 HC RX REV CODE- 250: Performed by: ORTHOPAEDIC SURGERY

## 2018-10-08 PROCEDURE — 77030003666 HC NDL SPINAL BD -A: Performed by: ORTHOPAEDIC SURGERY

## 2018-10-08 PROCEDURE — 77030033138 HC SUT PGA STRATFX J&J -B: Performed by: ORTHOPAEDIC SURGERY

## 2018-10-08 PROCEDURE — 0SRB04A REPLACEMENT OF LEFT HIP JOINT WITH CERAMIC ON POLYETHYLENE SYNTHETIC SUBSTITUTE, UNCEMENTED, OPEN APPROACH: ICD-10-PCS | Performed by: ORTHOPAEDIC SURGERY

## 2018-10-08 PROCEDURE — 77030011266 HC ELECTRD BLD INSL COVD -A: Performed by: ORTHOPAEDIC SURGERY

## 2018-10-08 PROCEDURE — 77030034849: Performed by: ORTHOPAEDIC SURGERY

## 2018-10-08 PROCEDURE — 77030018074 HC RTVR SUT ARTH4 S&N -B: Performed by: ORTHOPAEDIC SURGERY

## 2018-10-08 PROCEDURE — 74011250637 HC RX REV CODE- 250/637: Performed by: ORTHOPAEDIC SURGERY

## 2018-10-08 PROCEDURE — 77030034479 HC ADH SKN CLSR PRINEO J&J -B: Performed by: ORTHOPAEDIC SURGERY

## 2018-10-08 PROCEDURE — 77030018836 HC SOL IRR NACL ICUM -A: Performed by: ORTHOPAEDIC SURGERY

## 2018-10-08 PROCEDURE — 74011250636 HC RX REV CODE- 250/636

## 2018-10-08 PROCEDURE — 86580 TB INTRADERMAL TEST: CPT | Performed by: ORTHOPAEDIC SURGERY

## 2018-10-08 PROCEDURE — 77030002922 HC SUT FBRWRE ARTH -B: Performed by: ORTHOPAEDIC SURGERY

## 2018-10-08 PROCEDURE — 77030013727 HC IRR FAN PULSVC ZIMM -B: Performed by: ORTHOPAEDIC SURGERY

## 2018-10-08 PROCEDURE — 77030020782 HC GWN BAIR PAWS FLX 3M -B: Performed by: ANESTHESIOLOGY

## 2018-10-08 PROCEDURE — 74011250636 HC RX REV CODE- 250/636: Performed by: ORTHOPAEDIC SURGERY

## 2018-10-08 PROCEDURE — 74011250637 HC RX REV CODE- 250/637: Performed by: ANESTHESIOLOGY

## 2018-10-08 PROCEDURE — 77030007880 HC KT SPN EPDRL BBMI -B: Performed by: ANESTHESIOLOGY

## 2018-10-08 PROCEDURE — C1776 JOINT DEVICE (IMPLANTABLE): HCPCS | Performed by: ORTHOPAEDIC SURGERY

## 2018-10-08 PROCEDURE — 76060000035 HC ANESTHESIA 2 TO 2.5 HR: Performed by: ORTHOPAEDIC SURGERY

## 2018-10-08 PROCEDURE — 94762 N-INVAS EAR/PLS OXIMTRY CONT: CPT

## 2018-10-08 PROCEDURE — 74011000250 HC RX REV CODE- 250

## 2018-10-08 PROCEDURE — 85018 HEMOGLOBIN: CPT

## 2018-10-08 PROCEDURE — 77030019557 HC ELECTRD VES SEAL MEDT -F: Performed by: ORTHOPAEDIC SURGERY

## 2018-10-08 PROCEDURE — 76010000171 HC OR TIME 2 TO 2.5 HR INTENSV-TIER 1: Performed by: ORTHOPAEDIC SURGERY

## 2018-10-08 PROCEDURE — 77030003665 HC NDL SPN BBMI -A: Performed by: ANESTHESIOLOGY

## 2018-10-08 PROCEDURE — 36415 COLL VENOUS BLD VENIPUNCTURE: CPT

## 2018-10-08 PROCEDURE — 74011000302 HC RX REV CODE- 302: Performed by: ORTHOPAEDIC SURGERY

## 2018-10-08 PROCEDURE — 74011250636 HC RX REV CODE- 250/636: Performed by: ANESTHESIOLOGY

## 2018-10-08 DEVICE — 127 DEGREE NECK ANGLE HIP STEM
Type: IMPLANTABLE DEVICE | Site: HIP | Status: FUNCTIONAL
Brand: ACCOLADE

## 2018-10-08 DEVICE — CERAMIC V40 FEMORAL HEAD
Type: IMPLANTABLE DEVICE | Site: HIP | Status: FUNCTIONAL
Brand: BIOLOX

## 2018-10-08 DEVICE — 0 DEGREE POLYETHYLENE INSERT
Type: IMPLANTABLE DEVICE | Site: HIP | Status: FUNCTIONAL
Brand: TRIDENT

## 2018-10-08 DEVICE — 6.5MM LOW PROFILE HEX SCREW 35MM
Type: IMPLANTABLE DEVICE | Site: HIP | Status: FUNCTIONAL
Brand: TRIDENT II

## 2018-10-08 DEVICE — TRIDENT II TRITANIUM CLUSTER 48D
Type: IMPLANTABLE DEVICE | Site: HIP | Status: FUNCTIONAL
Brand: TRIDENT II

## 2018-10-08 RX ORDER — CEFAZOLIN SODIUM/WATER 2 G/20 ML
2 SYRINGE (ML) INTRAVENOUS ONCE
Status: COMPLETED | OUTPATIENT
Start: 2018-10-08 | End: 2018-10-08

## 2018-10-08 RX ORDER — EPHEDRINE SULFATE 50 MG/ML
INJECTION, SOLUTION INTRAVENOUS AS NEEDED
Status: DISCONTINUED | OUTPATIENT
Start: 2018-10-08 | End: 2018-10-08 | Stop reason: HOSPADM

## 2018-10-08 RX ORDER — ONDANSETRON 2 MG/ML
INJECTION INTRAMUSCULAR; INTRAVENOUS AS NEEDED
Status: DISCONTINUED | OUTPATIENT
Start: 2018-10-08 | End: 2018-10-08 | Stop reason: HOSPADM

## 2018-10-08 RX ORDER — SODIUM CHLORIDE, SODIUM LACTATE, POTASSIUM CHLORIDE, CALCIUM CHLORIDE 600; 310; 30; 20 MG/100ML; MG/100ML; MG/100ML; MG/100ML
75 INJECTION, SOLUTION INTRAVENOUS CONTINUOUS
Status: DISCONTINUED | OUTPATIENT
Start: 2018-10-08 | End: 2018-10-08 | Stop reason: HOSPADM

## 2018-10-08 RX ORDER — ROPIVACAINE HYDROCHLORIDE 2 MG/ML
INJECTION, SOLUTION EPIDURAL; INFILTRATION; PERINEURAL AS NEEDED
Status: DISCONTINUED | OUTPATIENT
Start: 2018-10-08 | End: 2018-10-08 | Stop reason: HOSPADM

## 2018-10-08 RX ORDER — HYDROMORPHONE HYDROCHLORIDE 2 MG/ML
1 INJECTION, SOLUTION INTRAMUSCULAR; INTRAVENOUS; SUBCUTANEOUS
Status: DISCONTINUED | OUTPATIENT
Start: 2018-10-08 | End: 2018-10-10 | Stop reason: HOSPADM

## 2018-10-08 RX ORDER — LIDOCAINE HYDROCHLORIDE 10 MG/ML
0.1 INJECTION, SOLUTION EPIDURAL; INFILTRATION; INTRACAUDAL; PERINEURAL AS NEEDED
Status: DISCONTINUED | OUTPATIENT
Start: 2018-10-08 | End: 2018-10-08 | Stop reason: HOSPADM

## 2018-10-08 RX ORDER — BUPIVACAINE HYDROCHLORIDE 7.5 MG/ML
INJECTION, SOLUTION INTRASPINAL AS NEEDED
Status: DISCONTINUED | OUTPATIENT
Start: 2018-10-08 | End: 2018-10-08 | Stop reason: HOSPADM

## 2018-10-08 RX ORDER — SIMVASTATIN 40 MG/1
40 TABLET, FILM COATED ORAL
Status: DISCONTINUED | OUTPATIENT
Start: 2018-10-08 | End: 2018-10-10 | Stop reason: HOSPADM

## 2018-10-08 RX ORDER — OXYCODONE HYDROCHLORIDE 5 MG/1
5 TABLET ORAL
Status: DISCONTINUED | OUTPATIENT
Start: 2018-10-08 | End: 2018-10-08 | Stop reason: HOSPADM

## 2018-10-08 RX ORDER — LORAZEPAM 1 MG/1
1 TABLET ORAL
Status: DISCONTINUED | OUTPATIENT
Start: 2018-10-08 | End: 2018-10-10 | Stop reason: HOSPADM

## 2018-10-08 RX ORDER — LEVOTHYROXINE SODIUM 100 UG/1
100 TABLET ORAL
Status: DISCONTINUED | OUTPATIENT
Start: 2018-10-09 | End: 2018-10-08 | Stop reason: SDUPTHER

## 2018-10-08 RX ORDER — NALOXONE HYDROCHLORIDE 0.4 MG/ML
.2-.4 INJECTION, SOLUTION INTRAMUSCULAR; INTRAVENOUS; SUBCUTANEOUS
Status: DISCONTINUED | OUTPATIENT
Start: 2018-10-08 | End: 2018-10-10 | Stop reason: HOSPADM

## 2018-10-08 RX ORDER — MIDAZOLAM HYDROCHLORIDE 1 MG/ML
2 INJECTION, SOLUTION INTRAMUSCULAR; INTRAVENOUS ONCE
Status: DISCONTINUED | OUTPATIENT
Start: 2018-10-08 | End: 2018-10-08 | Stop reason: HOSPADM

## 2018-10-08 RX ORDER — HYDROMORPHONE HYDROCHLORIDE 2 MG/ML
0.5 INJECTION, SOLUTION INTRAMUSCULAR; INTRAVENOUS; SUBCUTANEOUS
Status: DISCONTINUED | OUTPATIENT
Start: 2018-10-08 | End: 2018-10-08 | Stop reason: HOSPADM

## 2018-10-08 RX ORDER — SODIUM CHLORIDE 9 MG/ML
100 INJECTION, SOLUTION INTRAVENOUS CONTINUOUS
Status: DISPENSED | OUTPATIENT
Start: 2018-10-08 | End: 2018-10-10

## 2018-10-08 RX ORDER — POTASSIUM CHLORIDE 750 MG/1
10 TABLET, EXTENDED RELEASE ORAL
Status: DISCONTINUED | OUTPATIENT
Start: 2018-10-08 | End: 2018-10-10 | Stop reason: HOSPADM

## 2018-10-08 RX ORDER — HYDROMORPHONE HYDROCHLORIDE 2 MG/1
2 TABLET ORAL
Status: DISCONTINUED | OUTPATIENT
Start: 2018-10-08 | End: 2018-10-10 | Stop reason: HOSPADM

## 2018-10-08 RX ORDER — SERTRALINE HYDROCHLORIDE 50 MG/1
50 TABLET, FILM COATED ORAL DAILY
Status: DISCONTINUED | OUTPATIENT
Start: 2018-10-09 | End: 2018-10-10 | Stop reason: HOSPADM

## 2018-10-08 RX ORDER — ACETAMINOPHEN 500 MG
1000 TABLET ORAL EVERY 6 HOURS
Status: DISCONTINUED | OUTPATIENT
Start: 2018-10-09 | End: 2018-10-10 | Stop reason: HOSPADM

## 2018-10-08 RX ORDER — TRANEXAMIC ACID 100 MG/ML
INJECTION, SOLUTION INTRAVENOUS AS NEEDED
Status: DISCONTINUED | OUTPATIENT
Start: 2018-10-08 | End: 2018-10-08 | Stop reason: HOSPADM

## 2018-10-08 RX ORDER — ASPIRIN 81 MG/1
81 TABLET ORAL EVERY 12 HOURS
Status: DISCONTINUED | OUTPATIENT
Start: 2018-10-08 | End: 2018-10-10 | Stop reason: HOSPADM

## 2018-10-08 RX ORDER — DIPHENHYDRAMINE HCL 25 MG
25 CAPSULE ORAL
Status: DISCONTINUED | OUTPATIENT
Start: 2018-10-08 | End: 2018-10-10 | Stop reason: HOSPADM

## 2018-10-08 RX ORDER — AMOXICILLIN 250 MG
2 CAPSULE ORAL DAILY
Status: DISCONTINUED | OUTPATIENT
Start: 2018-10-09 | End: 2018-10-10 | Stop reason: HOSPADM

## 2018-10-08 RX ORDER — DEXAMETHASONE SODIUM PHOSPHATE 100 MG/10ML
10 INJECTION INTRAMUSCULAR; INTRAVENOUS ONCE
Status: ACTIVE | OUTPATIENT
Start: 2018-10-09 | End: 2018-10-09

## 2018-10-08 RX ORDER — POLYETHYLENE GLYCOL 3350 17 G/17G
17 POWDER, FOR SOLUTION ORAL AS NEEDED
Status: DISCONTINUED | OUTPATIENT
Start: 2018-10-08 | End: 2018-10-08

## 2018-10-08 RX ORDER — FUROSEMIDE 20 MG/1
20 TABLET ORAL DAILY
Status: DISCONTINUED | OUTPATIENT
Start: 2018-10-09 | End: 2018-10-08

## 2018-10-08 RX ORDER — ACETAMINOPHEN 10 MG/ML
1000 INJECTION, SOLUTION INTRAVENOUS ONCE
Status: COMPLETED | OUTPATIENT
Start: 2018-10-08 | End: 2018-10-08

## 2018-10-08 RX ORDER — PROPOFOL 10 MG/ML
INJECTION, EMULSION INTRAVENOUS
Status: DISCONTINUED | OUTPATIENT
Start: 2018-10-08 | End: 2018-10-08 | Stop reason: HOSPADM

## 2018-10-08 RX ORDER — FENTANYL CITRATE 50 UG/ML
100 INJECTION, SOLUTION INTRAMUSCULAR; INTRAVENOUS ONCE
Status: DISCONTINUED | OUTPATIENT
Start: 2018-10-08 | End: 2018-10-08 | Stop reason: HOSPADM

## 2018-10-08 RX ORDER — SODIUM CHLORIDE 0.9 % (FLUSH) 0.9 %
5-10 SYRINGE (ML) INJECTION AS NEEDED
Status: DISCONTINUED | OUTPATIENT
Start: 2018-10-08 | End: 2018-10-10 | Stop reason: HOSPADM

## 2018-10-08 RX ORDER — TRANEXAMIC ACID 650 1/1
1300 TABLET ORAL
Status: COMPLETED | OUTPATIENT
Start: 2018-10-08 | End: 2018-10-08

## 2018-10-08 RX ORDER — DEXAMETHASONE SODIUM PHOSPHATE 4 MG/ML
INJECTION, SOLUTION INTRA-ARTICULAR; INTRALESIONAL; INTRAMUSCULAR; INTRAVENOUS; SOFT TISSUE AS NEEDED
Status: DISCONTINUED | OUTPATIENT
Start: 2018-10-08 | End: 2018-10-08 | Stop reason: HOSPADM

## 2018-10-08 RX ORDER — FUROSEMIDE 20 MG/1
20 TABLET ORAL
Status: DISCONTINUED | OUTPATIENT
Start: 2018-10-08 | End: 2018-10-10 | Stop reason: HOSPADM

## 2018-10-08 RX ORDER — CEFAZOLIN SODIUM/WATER 2 G/20 ML
2 SYRINGE (ML) INTRAVENOUS EVERY 8 HOURS
Status: COMPLETED | OUTPATIENT
Start: 2018-10-08 | End: 2018-10-09

## 2018-10-08 RX ORDER — LEVOTHYROXINE SODIUM 100 UG/1
100 TABLET ORAL
Status: DISCONTINUED | OUTPATIENT
Start: 2018-10-09 | End: 2018-10-10 | Stop reason: HOSPADM

## 2018-10-08 RX ORDER — LIDOCAINE HYDROCHLORIDE 20 MG/ML
INJECTION, SOLUTION EPIDURAL; INFILTRATION; INTRACAUDAL; PERINEURAL AS NEEDED
Status: DISCONTINUED | OUTPATIENT
Start: 2018-10-08 | End: 2018-10-08 | Stop reason: HOSPADM

## 2018-10-08 RX ORDER — KETOROLAC TROMETHAMINE 30 MG/ML
INJECTION, SOLUTION INTRAMUSCULAR; INTRAVENOUS AS NEEDED
Status: DISCONTINUED | OUTPATIENT
Start: 2018-10-08 | End: 2018-10-08 | Stop reason: HOSPADM

## 2018-10-08 RX ORDER — SODIUM CHLORIDE, SODIUM LACTATE, POTASSIUM CHLORIDE, CALCIUM CHLORIDE 600; 310; 30; 20 MG/100ML; MG/100ML; MG/100ML; MG/100ML
INJECTION, SOLUTION INTRAVENOUS
Status: DISCONTINUED | OUTPATIENT
Start: 2018-10-08 | End: 2018-10-08 | Stop reason: HOSPADM

## 2018-10-08 RX ORDER — SODIUM CHLORIDE 9 MG/ML
INJECTION INTRAMUSCULAR; INTRAVENOUS; SUBCUTANEOUS AS NEEDED
Status: DISCONTINUED | OUTPATIENT
Start: 2018-10-08 | End: 2018-10-08 | Stop reason: HOSPADM

## 2018-10-08 RX ORDER — ONDANSETRON 8 MG/1
8 TABLET, ORALLY DISINTEGRATING ORAL
Status: DISCONTINUED | OUTPATIENT
Start: 2018-10-08 | End: 2018-10-10 | Stop reason: HOSPADM

## 2018-10-08 RX ORDER — OXYCODONE HYDROCHLORIDE 5 MG/1
10 TABLET ORAL
Status: DISCONTINUED | OUTPATIENT
Start: 2018-10-08 | End: 2018-10-08 | Stop reason: HOSPADM

## 2018-10-08 RX ORDER — NEOMYCIN AND POLYMYXIN B SULFATES 40; 200000 MG/ML; [USP'U]/ML
SOLUTION IRRIGATION AS NEEDED
Status: DISCONTINUED | OUTPATIENT
Start: 2018-10-08 | End: 2018-10-08 | Stop reason: HOSPADM

## 2018-10-08 RX ORDER — FAMOTIDINE 20 MG/1
20 TABLET, FILM COATED ORAL ONCE
Status: COMPLETED | OUTPATIENT
Start: 2018-10-08 | End: 2018-10-08

## 2018-10-08 RX ADMIN — Medication 3 AMPULE: at 10:16

## 2018-10-08 RX ADMIN — FAMOTIDINE 20 MG: 20 TABLET, FILM COATED ORAL at 10:33

## 2018-10-08 RX ADMIN — ASPIRIN 81 MG: 81 TABLET, COATED ORAL at 20:19

## 2018-10-08 RX ADMIN — Medication 2 G: at 12:13

## 2018-10-08 RX ADMIN — SODIUM CHLORIDE 100 ML/HR: 900 INJECTION, SOLUTION INTRAVENOUS at 17:08

## 2018-10-08 RX ADMIN — TRANEXAMIC ACID 1300 MG: 650 TABLET, FILM COATED ORAL at 17:05

## 2018-10-08 RX ADMIN — ACETAMINOPHEN 1000 MG: 10 INJECTION, SOLUTION INTRAVENOUS at 17:06

## 2018-10-08 RX ADMIN — TRANEXAMIC ACID 1000 MG: 100 INJECTION, SOLUTION INTRAVENOUS at 12:26

## 2018-10-08 RX ADMIN — TRANEXAMIC ACID 1300 MG: 650 TABLET, FILM COATED ORAL at 20:19

## 2018-10-08 RX ADMIN — Medication 1 AMPULE: at 21:00

## 2018-10-08 RX ADMIN — Medication 2 G: at 20:19

## 2018-10-08 RX ADMIN — SODIUM CHLORIDE, SODIUM LACTATE, POTASSIUM CHLORIDE, CALCIUM CHLORIDE: 600; 310; 30; 20 INJECTION, SOLUTION INTRAVENOUS at 12:40

## 2018-10-08 RX ADMIN — SODIUM CHLORIDE, SODIUM LACTATE, POTASSIUM CHLORIDE, AND CALCIUM CHLORIDE 75 ML/HR: 600; 310; 30; 20 INJECTION, SOLUTION INTRAVENOUS at 10:16

## 2018-10-08 RX ADMIN — BUPIVACAINE HYDROCHLORIDE 1.7 ML: 7.5 INJECTION, SOLUTION INTRASPINAL at 12:31

## 2018-10-08 RX ADMIN — ONDANSETRON 4 MG: 2 INJECTION INTRAMUSCULAR; INTRAVENOUS at 12:56

## 2018-10-08 RX ADMIN — LIDOCAINE HYDROCHLORIDE 20 MG: 20 INJECTION, SOLUTION EPIDURAL; INFILTRATION; INTRACAUDAL; PERINEURAL at 12:42

## 2018-10-08 RX ADMIN — EPHEDRINE SULFATE 5 MG: 50 INJECTION, SOLUTION INTRAVENOUS at 12:57

## 2018-10-08 RX ADMIN — EPHEDRINE SULFATE 5 MG: 50 INJECTION, SOLUTION INTRAVENOUS at 13:19

## 2018-10-08 RX ADMIN — PROPOFOL 25 MCG/KG/MIN: 10 INJECTION, EMULSION INTRAVENOUS at 12:36

## 2018-10-08 RX ADMIN — SIMVASTATIN 40 MG: 40 TABLET, FILM COATED ORAL at 20:19

## 2018-10-08 RX ADMIN — DEXAMETHASONE SODIUM PHOSPHATE 6 MG: 4 INJECTION, SOLUTION INTRA-ARTICULAR; INTRALESIONAL; INTRAMUSCULAR; INTRAVENOUS; SOFT TISSUE at 12:56

## 2018-10-08 RX ADMIN — EPHEDRINE SULFATE 10 MG: 50 INJECTION, SOLUTION INTRAVENOUS at 13:40

## 2018-10-08 RX ADMIN — LORAZEPAM 1 MG: 1 TABLET ORAL at 22:18

## 2018-10-08 RX ADMIN — SODIUM CHLORIDE, SODIUM LACTATE, POTASSIUM CHLORIDE, CALCIUM CHLORIDE: 600; 310; 30; 20 INJECTION, SOLUTION INTRAVENOUS at 12:11

## 2018-10-08 RX ADMIN — TUBERCULIN PURIFIED PROTEIN DERIVATIVE 5 UNITS: 5 INJECTION, SOLUTION INTRADERMAL at 10:17

## 2018-10-08 NOTE — H&P
[facility] [employer] Pre Operative History and Physical Exam 
 
Patient ID: 
Ariana De [id number] 
80 y.o. 
11/11/1932 Today: October 8, 2018 CC:  Left hip pain HPI:   The patient has end stage arthritis of the left hip. The patient was evaluated and examined in the office prior to today and was found to have a history and physical exam consistent with intra-articular pathology of the left hip. Patient complains of anterior groin pain predominately. Pain occurs during activity. Patient has difficulty putting on socks/shoes and has notable pain when getting up from a chair and getting out of a car. Patient does not complain of significant lateral hip pain or radicular pain down the leg. There have been no changes to the patient's orthopedic condition since the last office visit Past Medical History: 
Past Medical History:  
Diagnosis Date  Anxiety   
 managed with medication  Decreased mobility   
 using rollator and wheelchair  Edema   
 bilateral feet/ankles (takes lasix PRN)  GERD (gastroesophageal reflux disease)   
 managed with medication  [place] (hard of hearing) does not wear hearing aids  Hx of echocardiogram 01/04/2017 EF 60.8%, mild aortic valve sclerosis, mild AR, mild MR  
 Hyperlipidemia  Hypertension   
 managed with medication  Hypothyroid   
 managed with medication  Osteoarthritis  Pneumonia 2003 Past Surgical History: 
Past Surgical History:  
Procedure Laterality Date  HX HIP REPLACEMENT Right 2003  HX OTHER SURGICAL  2013  
 lung tumor Medications: 
  
Prior to Admission medications Medication Sig Start Date End Date Taking? Authorizing Provider  
naproxen sodium (ALEVE) 220 mg cap Take  by mouth as needed. Historical Provider LORazepam (ATIVAN) 1 mg tablet Take 1 Tab by mouth daily as needed for Anxiety. 8/1/18   Jazzmine Molina MD  
sertraline (ZOLOFT) 100 mg tablet Take 1 Tab by mouth daily. Patient taking differently: Take 50 mg by mouth daily. 8/1/18   Liz Aguilar MD  
omeprazole (PRILOSEC) 40 mg capsule Take 1 Cap by mouth daily. 8/1/18   Liz Aguilar MD  
simvastatin (ZOCOR) 40 mg tablet TAKE 1 TABLET BY MOUTH EVERY NIGHT 8/1/18   Liz Aguilar MD  
losartan-hydroCHLOROthiazide [facility]) 50-12.5 mg per tablet Take 1 Tab by mouth daily. 8/1/18   Liz Aguilar MD  
furosemide (LASIX) 20 mg tablet TAKE 1 TABLET BY MOUTH DAILY FOR 3 DAYS OR UNTIL SWELLING IS GONE FROM BOTH LEGS 8/1/18   Liz Aguilar MD  
levothyroxine (SYNTHROID) 100 mcg tablet TAKE 1 TABLET BY MOUTH DAILY BEFORE BREAKFAST 8/1/18   Liz Aguilar MD  
[facility] medical supply misc Wheelchair, mechanical for ambulation, hip pain, poor ambulation 8/1/18   Liz Aguilar MD  
[facility] medical supply [facility] Handicap Placard - use for poor ambulation. 8/1/18   Liz Aguilar MD  
polyethylene glycol [facility]) 17 gram/dose powder Take 17 g by mouth daily. 1 tablespoon with 8 oz of water daily Patient taking differently: Take 17 g by mouth as needed. 1 tablespoon with 8 oz of water daily 7/8/18   Winston Rascon MD  
levothyroxine (SYNTHROID) 100 mcg tablet Take 1 Tab by mouth Daily (before breakfast). 5/3/18   Liz Aguilar MD  
potassium chloride (KLOR-CON) 10 mEq tablet TAKE 1 TABLET BY MOUTH TWICE DAILY ONLY WHEN TAKING LASIX 5/3/18   Liz Aguilar MD  
diclofenac (VOLTAREN) 1 % gel Apply 4 g to affected area four (4) times daily. For knee pain 5/3/18   Liz Aguilar MD  
lactase (LACTAID) 3,000 unit tablet Pt may use up to three times daily as needed with meals. Patient not taking: Reported on 9/25/2018 5/3/18   [facility] Zippered pair of compression stocking for lower extremity edema. Pt says cannot get on other type. (1 pair) 7/5/17   Liz Aguilar MD  
cyanocobalamin (VITAMIN B12) 500 mcg tablet Take 500 mcg by mouth daily. Historical Provider Family History: Family History Problem Relation Age of Onset  Heart Disease Mother  Heart Disease Father Social History:  
  
Social History Substance Use Topics  Smoking status: Never Smoker  Smokeless tobacco: Never Used  Alcohol use No  
   
 
Allergies: Allergies Allergen Reactions  Amoxicillin Rash  Penicillins Other (comments) \"Mouth breaks out\"  Sulfa (Sulfonamide Antibiotics) Other (comments) Vitals:  
  
Visit Vitals  Ht 5' 6\" (1.676 m)  Wt 56.4 kg (124 lb 4 oz)  BMI 20.05 kg/m2 Objective:  
      General: No Acute distress HEENT: Normocephalic/atramatic Lungs:  Breathing non-labored Heart:   RRR Abdomen: soft Extremities:  Pain with ROM of the left hip which manifests as anterior groin pain. There is decreased internal and external rotation of the affected hip. No significant pain with palpation over the greater trochanteric bursa. No radicular pain with straight leg raise. Patient walks with and antalgic gait. Distally patient has no neurologic deficit. Patient Active Problem List  
Diagnosis Code  Anxiety and depression F41.9, F32.9  Hyponatremia E87.1  Hypokalemia E87.6  Vitamin B 12 deficiency E53.8  Hyperlipidemia E78.5  Other vitamin B12 deficiency anemia D51.8  Decreased ambulation status Z74.09  
 Chronic low back pain with sciatica M54.40, G89.29  
 Poor tolerance for ambulation Z78.9  Arthralgia M25.50  Bilateral edema of lower extremity R60.0  Precordial pain R07.2  Localized edema R60.0  Diabetes mellitus without complication ([facility] [address] N32.3  Type 2 diabetes with nephropathy ([facility]) E11.21 Assessment: 1. Arthritis of the Left hip Plan: The patient has failed previous conservative treatment for this condition.  The patient has pain in the left hip which causes daily symptoms which affects the patient's activities of daily living and quality of life. The risks, benefits, alternatives and possible complications of left total hip arthroplasty have been discussed with the patient including but not limited to infection, bleeding, damage to nerves and blood vessels with particular attention given to risk of damage of the femoral, obturator, lateral femoral cutaneous, superior gluteal, inferior gluteal, and sciatic nerve, risk of dislocation, fracture both intra-op and post-op, limb length inequality, polyethylene wear, implant loosening, risk for continued pain, and risk for revision surgery secondary to but not limited to all of these discussed risks. Further we discussed risk of venous thrombo-embolism including deep vein thrombosis and pulmonary embolism despite being on prophylaxis. We have also previously discussed the potential of morbidity and mortality associated with, but not limited to, the actual surgical procedure, anesthesia, prior medical conditions, and/or the administration of medications perioperatively. I have made no guarantees to the patient regarding outcomes and the patient has voiced understanding of that. The patient has been given the opportunity to ask questions all of which have been answered and the patient wishes to proceed. The patient will be admitted the day of surgery for left total hip replacement. Signed By: Duong Camejo MD 
October 8, 2018

## 2018-10-08 NOTE — PERIOP NOTES
Betadine lavage: 17.5cc of betadine lot # [id number], exp. Date 01/2020, 
in 500cc of . 9NS Lot # [id number], exp. Date : 08/2021

## 2018-10-08 NOTE — PERIOP NOTES
TRANSFER - OUT REPORT: 
 
Verbal report given to [facility] RN(name) on Ale Ferguson  being transferred to [facility](unit) for routine progression of care Report consisted of patients Situation, Background, Assessment and  
Recommendations(SBAR). Information from the following report(s) SBAR, Kardex, OR Summary, Intake/Output, MAR and Cardiac Rhythm NSR was reviewed with the receiving nurse. Lines:  
Peripheral IV 10/08/18 Left Forearm (Active) Site Assessment Clean, dry, & intact 10/8/2018  2:26 PM  
Phlebitis Assessment 0 10/8/2018  2:26 PM  
Infiltration Assessment 0 10/8/2018  2:26 PM  
Dressing Status Clean, dry, & intact 10/8/2018  2:26 PM  
Dressing Type Transparent;Tape 10/8/2018  2:26 PM  
Hub Color/Line Status Infusing;Pink 10/8/2018  2:26 PM  
Action Taken Blood drawn 10/8/2018 10:05 AM  
  
 
Opportunity for questions and clarification was provided. Patient transported with: 
 room air

## 2018-10-08 NOTE — PROGRESS NOTES
TRANSFER - IN REPORT: 
 
Verbal report received from Vanita Torres on [facility]  being received from PACU for routine post - op Report consisted of patients Situation, Background, Assessment and  
Recommendations(SBAR). Information from the following report(s) SBAR was reviewed with the receiving nurse. Opportunity for questions and clarification was provided. Assessment completed upon patients arrival to unit and care assumed.

## 2018-10-08 NOTE — ANESTHESIA PREPROCEDURE EVALUATION
Anesthetic History No history of anesthetic complications Review of Systems / Medical History Patient summary reviewed and pertinent labs reviewed Pulmonary Within defined limits Neuro/Psych Within defined limits Cardiovascular Hypertension: well controlled Hyperlipidemia Exercise tolerance: <4 METS: Limited by hip pain Comments: Denies recent CP, SOB or Palpitations Echo 2017 showing EF 60% and mild AI, mild MR.  
GI/Hepatic/Renal 
  
GERD: well controlled Endo/Other Hypothyroidism: well controlled Arthritis Other Findings Physical Exam 
 
Airway Mallampati: II 
TM Distance: < 4 cm Neck ROM: normal range of motion Mouth opening: Normal 
 
 Cardiovascular Regular rate and rhythm,  S1 and S2 normal,  no murmur, click, rub, or gallop Dental 
No notable dental hx Pulmonary Breath sounds clear to auscultation Abdominal 
GI exam deferred Other Findings Anesthetic Plan ASA: 3 Anesthesia type: spinal 
 
 
 
 
 
Anesthetic plan and risks discussed with: Patient, Family, Son / Daughter and Spouse

## 2018-10-08 NOTE — PERIOP NOTES
TRANSFER - OUT REPORT: 
 
Verbal report given to JAMAR RN(name) on Sam Ringgold  being transferred to PRE-OP(unit) for routine progression of care Report consisted of patients Situation, Background, Assessment and  
Recommendations(SBAR). Information from the following report(s) SBAR, MAR and Recent Results was reviewed with the receiving nurse. Lines:  
Peripheral IV 10/08/18 Left Forearm (Active) Site Assessment Clean, dry, & intact 10/8/2018 10:05 AM  
Phlebitis Assessment 0 10/8/2018 10:05 AM  
Infiltration Assessment 0 10/8/2018 10:05 AM  
Dressing Status Clean, dry, & intact 10/8/2018 10:05 AM  
Dressing Type Tape;Transparent 10/8/2018 10:05 AM  
Hub Color/Line Status Pink; Infusing 10/8/2018 10:05 AM  
Action Taken Blood drawn 10/8/2018 10:05 AM  
  
 
Opportunity for questions and clarification was provided. Patient transported with: 
 [employer]

## 2018-10-08 NOTE — IP AVS SNAPSHOT
[address] 
[phone] Patient: Ata Lafleur MRN: [id number]:[id number] About your hospitalization You were admitted on:  October 8, 2018 You last received care in the:  [facility] 1 You were discharged on:  October 10, 2018 Why you were hospitalized Your primary diagnosis was:  Not on File Your diagnoses also included:  Oa (Osteoarthritis) Of Hip Follow-up Information Follow up With Details Comments Contact Info Philip Gaviria MD  As needed [address] [facility] 3 [address] Abe [address]icona 
[phone] Louie Spencer MD  As scheduled by office [address] [facility] [address] [place] [address] 
[phone] [address]  Will contact you within 48 hrs [address] [address] 
[phone] Your Scheduled Appointments Thursday November 01, 2018  8:50 AM EDT  
LAB with POW [facility] PRIMARY CARE ([facility] PRIMARY [facility]ARE) [address] [facility] 14.  
[phone] Thursday November 08, 2018 10:30 AM EST Office Visit with Philip Gaviria MD  
[facility] ([address] [place] [address]) [address] [place] [address] 14. [phone] Discharge Orders None A check shanon indicates which time of day the medication should be taken. My Medications START taking these medications Instructions Each Dose to Equal  
 Morning Noon Evening Bedtime  
 acetaminophen 500 mg tablet Commonly known as:  TYLENOL Your next dose is: Today Take 2 Tabs by mouth every six (6) hours. 1000 mg  
    
   
   
  
   
  
 aspirin delayed-release 81 mg tablet Your next dose is: Today Take 1 Tab by mouth every twelve (12) hours every twelve (12) hours.   
 81 mg  
    
   
   
  
   
  
 HYDROcodone-acetaminophen 5-325 mg per tablet Commonly known as:  Latanya Wallace Your next dose is: Today Take 1 Tab by mouth every four (4) hours as needed for Pain. Max Daily Amount: 6 Tabs. 1 Tab  
    
   
   
  
   
  
 ondansetron 8 mg disintegrating tablet Commonly known as:  ZOFRAN ODT Your next dose is: Today Take 1 Tab by mouth every eight (8) hours as needed for Nausea. 8 mg  
    
   
   
  
   
  
 senna-docusate 8.6-50 mg per tablet Commonly known as:  Adrianna Bryant Your next dose is:  Tomorrow Take 2 Tabs by mouth daily. 2 Tab CHANGE how you take these medications Instructions Each Dose to Equal  
 Morning Noon Evening Bedtime  
 polyethylene glycol 17 gram/dose powder Commonly known as:  Panchito David What changed:   
- when to take this 
- reasons to take this 
- additional instructions Your next dose is:  Tomorrow Take 17 g by mouth daily. 1 tablespoon with 8 oz of water daily 17 g  
    
  
   
   
   
  
 sertraline 100 mg tablet Commonly known as:  ZOLOFT What changed:  how much to take Your next dose is:  Tomorrow Take 1 Tab by mouth daily. 100 mg CONTINUE taking these medications Instructions Each Dose to Equal  
 Morning Noon Evening Bedtime ALEVE 220 mg Cap Generic drug:  naproxen sodium Your next dose is:  DO NOT TAKE X 5 WEEKS!!  
   
 Take  by mouth as needed. cyanocobalamin 500 mcg tablet Commonly known as:  VITAMIN B12 Your next dose is:  Tomorrow Take 500 mcg by mouth daily. 500 mcg  
    
  
   
   
   
  
 diclofenac 1 % Gel Commonly known as:  VOLTAREN Your next dose is: Today Apply 4 g to affected area four (4) times daily. For knee pain  
 4 g  
    
   
   
  
   
  
 furosemide 20 mg tablet Commonly known as:  LASIX Your next dose is:  Tomorrow TAKE 1 TABLET BY MOUTH DAILY FOR 3 DAYS OR UNTIL SWELLING IS GONE FROM BOTH LEGS  
     
  
   
   
   
  
 lactase 3,000 unit tablet Commonly known as:  Anabel Farr Your next dose is: Today Pt may use up to three times daily as needed with meals. * levothyroxine 100 mcg tablet Commonly known as:  SYNTHROID Your next dose is:  Tomorrow Take 1 Tab by mouth Daily (before breakfast). 100 mcg * levothyroxine 100 mcg tablet Commonly known as:  SYNTHROID Your next dose is:  Tomorrow TAKE 1 TABLET BY MOUTH DAILY BEFORE BREAKFAST LORazepam 1 mg tablet Commonly known as:  ATIVAN Your next dose is: Today Take 1 Tab by mouth daily as needed for Anxiety. 1 mg  
    
   
   
  
   
  
 losartan-hydroCHLOROthiazide 50-12.5 mg per tablet Commonly known as:  HYZAAR Your next dose is:  Tomorrow Take 1 Tab by mouth daily. 1 Tab * miscellaneous medical supply Misc Zippered pair of compression stocking for lower extremity edema. Pt says cannot get on other type. (1 pair) * miscellaneous medical supply Misc Wheelchair, mechanical for ambulation, hip pain, poor ambulation * miscellaneous medical supply Mis Handicap Placard - use for poor ambulation. omeprazole 40 mg capsule Commonly known as:  PRILOSEC Your next dose is:  Tomorrow Take 1 Cap by mouth daily. 40 mg  
    
  
   
   
   
  
 potassium chloride 10 mEq tablet Commonly known as:  KLOR-CON Your next dose is: Today TAKE 1 TABLET BY MOUTH TWICE DAILY ONLY WHEN TAKING LASIX  
     
   
   
  
   
  
 simvastatin 40 mg tablet Commonly known as:  ZOCOR Your next dose is: Today TAKE 1 TABLET BY MOUTH EVERY NIGHT * Notice: This list has 5 medication(s) that are the same as other medications prescribed for you. Read the directions carefully, and ask your doctor or other care provider to review them with you. Where to Get Your Medications Information on where to get these meds will be given to you by the nurse or doctor. ! Ask your nurse or doctor about these medications  
  acetaminophen 500 mg tablet  
 aspirin delayed-release 81 mg tablet HYDROcodone-acetaminophen 5-325 mg per tablet  
 ondansetron 8 mg disintegrating tablet  
 senna-docusate 8.6-50 mg per tablet Opioid Education Prescription Opioids: What You Need to Know: 
 
Prescription opioids can be used to help relieve moderate-to-severe pain and are often prescribed following a surgery or injury, or for certain health conditions. These medications can be an important part of treatment but also come with serious risks. Opioids are strong pain medicines. Examples include hydrocodone, oxycodone, fentanyl, and morphine. Heroin is an example of an illegal opioid. It is important to work with your health care provider to make sure you are getting the safest, most effective care. WHAT ARE THE RISKS AND SIDE EFFECTS OF OPIOID USE? Prescription opioids carry serious risks of addiction and overdose, especially with prolonged use. An opioid overdose, often marked by slow breathing, can cause sudden death. The use of prescription opioids can have a number of side effects as well, even when taken as directed. · Tolerance-meaning you might need to take more of a medication for the same pain relief · Physical dependence-meaning you have symptoms of withdrawal when the medication is stopped. Withdrawal symptoms can include nausea, sweating, chills, diarrhea, stomach cramps, and muscle aches. Withdrawal can last up to several weeks, depending on which drug you took and how long you took it. · Increased sensitivity to pain · Constipation · Nausea, vomiting, and dry mouth · Sleepiness and dizziness · Confusion · Depression · Low levels of testosterone that can result in lower sex drive, energy, and strength · Itching and sweating RISKS ARE GREATER WITH:      
· History of drug misuse, substance use disorder, or overdose · Mental health conditions (such as depression or anxiety) · Sleep apnea · Older age (72 years or older) · Pregnancy Avoid alcohol while taking prescription opioids. Also, unless specifically advised by your health care provider, medications to avoid include: · Benzodiazepines (such as Xanax or Valium) · Muscle relaxants (such as Soma or Flexeril) · Hypnotics (such as Ambien or Lunesta) · Other prescription opioids KNOW YOUR OPTIONS Talk to your health care provider about ways to manage your pain that don't involve prescription opioids. Some of these options may actually work better and have fewer risks and side effects. Consult your physician before adding or stopping any medications, treatments, or physical activity. Options may include: 
· Pain relievers such as acetaminophen, ibuprofen, and naproxen · Some medications that are also used for depression or seizures · Physical therapy and exercise · Counseling to help patients learn how to cope better with triggers of pain and stress. · Application of heat or cold compress · Massage therapy · Relaxation techniques Be Informed Make sure you know the name of your medication, how much and how often to take it, and its potential risks & side effects. IF YOU ARE PRESCRIBED OPIOIDS FOR PAIN: 
· Never take opioids in greater amounts or more often than prescribed. Remember the goal is not to be pain-free but to manage your pain at a tolerable level. · Follow up with your primary care provider to: · Work together to create a plan on how to manage your pain.  
· Talk about ways to help manage your pain that don't involve prescription opioids. · Talk about any and all concerns and side effects. · Help prevent misuse and abuse. · Never sell or share prescription opioids · Help prevent misuse and abuse. · Store prescription opioids in a secure place and out of reach of others (this may include visitors, children, friends, and family). · Safely dispose of unused/unwanted prescription opioids: Find your community drug take-back program or your pharmacy mail-back program, or flush them down the toilet, following guidance from the Food and Drug Administration (www.fda.gov/Drugs/ResourcesForYou). · Visit www.cdc.gov/drugoverdose to learn about the risks of opioid abuse and overdose. · If you believe you may be struggling with addiction, tell your health care provider and ask for guidance or call [address] [place] [address] [employer] at [phone]. Discharge Instructions [facility] [employer] Patient Discharge Instructions Ariana Krishnamurthy / [id number] : 1932 Admitted 10/8/2018 Discharged: 10/10/2018 IF YOU HAVE ANY PROBLEMS ONCE YOU ARE AT HOME CALL THE FOLLOWING NUMBERS:  
Main office number: [phone] Take Home Medications · It is important that you take the medication exactly as they are prescribed. · Keep your medication in the bottles provided by the pharmacist and keep a list of the medication names, dosages, and times to be taken in your wallet. · Do not take other medications without consulting your doctor. What to do at [facility] Resume your prehospital diet. If you have excessive nausea or vomitting call your doctor's office Home Physical Therapy is arranged. Use rolling walker when walking. Patients who have had a joint replacement should not drive until you are seen for your follow up appointment by Dr. Yue Parra. When to Call - Call if you have a temperature greater then 101 
- Unable to keep food down - Loose control of your bladder or bowel function - Are unable to bear any weight  
- Need a pain medication refill DISCHARGE SUMMARY from Nurse The following personal items collected during your admission are returned to you:  
Dental Appliance: Dental Appliances: None Vision: Visual Aid: Glasses Hearing Aid:   
Jewelry: Jewelry: None Clothing: Clothing: Other (comment) Other Valuables: Other Valuables: Wallet (PT'S GRAND DAUGHTER HAS VALUABLES) Valuables sent to safe:   
 
PATIENT INSTRUCTIONS: 
 
After general anesthesia or intravenous sedation, for 24 hours or while taking prescription Narcotics: · Limit your activities · Do not drive and operate hazardous machinery · Do not make important personal or business decisions · Do  not drink alcoholic beverages · If you have not urinated within 8 hours after discharge, please contact your surgeon on call. Report the following to your surgeon: 
· Excessive pain, swelling, redness or odor of or around the surgical area · Temperature over 101 · Nausea and vomiting lasting longer than 4 hours or if unable to take medications · Any signs of decreased circulation or nerve impairment to extremity: change in color, persistent  numbness, tingling, coldness or increase pain · Follow hip precautions @ all times! · Any questions, call office @ [phone] Keep scheduled follow up appointment. If need to change, call office @ [phone]. *  Please give a list of your current medications to your Primary Care Provider. *  Please update this list whenever your medications are discontinued, doses are 
    changed, or new medications (including over-the-counter products) are added. *  Please carry medication information at all times in case of emergency situations. Hip Replacement Surgery (Posterior): What to Expect at [facility] Your Recovery Hip replacement surgery replaces the worn parts of your hip joint.  When you leave the hospital, you will probably be walking with crutches or a walker. You may be able to climb a few stairs and get in and out of bed and chairs. But you will need someone to help you at home for the next few weeks or until you have more energy and can move around better. If there is no one to help you at home, you may go to a rehabilitation center or long-term care center. You will go home with a bandage and stitches, staples, tissue glue, or tape strips. You can remove the bandage when your doctor tells you to. If you have stitches or staples, your doctor will remove them 10 days to 3 weeks after your surgery. Glue or tape strips will fall off on their own over time. You may still have some mild pain, and the area may be swollen for 3 to 4 months after surgery. Your doctor will give you medicine for the pain. You will continue the rehabilitation program (rehab) you started in the hospital. The better you do with your rehab exercises, the sooner you will get your strength and movement back. Most people are able to return to work 4 weeks to 4 months after surgery. This care sheet gives you a general idea about how long it will take for you to recover. But each person recovers at a different pace. Follow the steps below to get better as quickly as possible. How can you care for yourself at home? Activity 
  · Your doctor may not want your affected leg to cross the center of your body toward the other leg. If so, your therapist may suggest these ideas: ¨ Do not cross your legs. ¨ Be very careful as you get in or out of bed or a car, so your leg does not cross that imaginary line in the middle of your body.  
  · Rest when you feel tired. You may take a nap, but do not stay in bed all day.  
  · Work with your physical therapist to learn the best way to exercise. You may be able to take frequent, short walks using crutches or a walker. You will probably have to use crutches or a walker for at least 4 to 6 weeks.  
  · Your doctor may advise you to stay away from activities that put stress on the joint. This includes sports such as tennis, football, and jogging.  
  · Try not to sit for too long at one time. You will feel less stiff if you take a short walk about every hour. When you sit, use chairs with arms, and do not sit in low chairs.  
  · Do not bend over more than 90 degrees (like the angle in a letter \"L\").  
  · Sleep on your back with your legs slightly apart or on your side with a pillow between your knees for about 6 weeks or as your doctor tells you. Do not sleep on your stomach or affected leg.  
  · Ask your doctor when you can drive again.  
  · Most people are able to return to work 4 weeks to 4 months after surgery.  
  · Ask your doctor when it is okay for you to have sex. Diet 
  · By the time you leave the hospital, you will probably be eating your normal diet. If your stomach is upset, try bland, low-fat foods like plain rice, broiled chicken, toast, and yogurt. Your doctor may recommend that you take iron and vitamin supplements.  
  · Drink plenty of fluids (unless your doctor tells you not to).   · Eat healthy foods, and watch your portion sizes. Try to stay at your ideal weight. Too much weight puts more stress on your new hip joint.  
  · You may notice that your bowel movements are not regular right after your surgery. This is common. Try to avoid constipation and straining with bowel movements. You may want to take a fiber supplement every day. If you have not had a bowel movement after a couple of days, ask your doctor about taking a mild laxative. Medicines 
  · Your doctor will tell you if and when you can restart your medicines.  He or she will also give you instructions about taking any new medicines.  
  · If you take blood thinners, such as warfarin (Coumadin), clopidogrel (Plavix), or aspirin, be sure to talk to your doctor. He or she will tell you if and when to start taking those medicines again. Make sure that you understand exactly what your doctor wants you to do.  
  · Your doctor may give you a blood-thinning medicine to prevent blood clots. If you take a blood thinner, be sure you get instructions about how to take your medicine safely. Blood thinners can cause serious bleeding problems. This medicine could be in pill form or as a shot (injection). If a shot is necessary, your doctor will tell you how to do this.  
  · Be safe with medicines. Take pain medicines exactly as directed. ¨ If the doctor gave you a prescription medicine for pain, take it as prescribed. ¨ If you are not taking a prescription pain medicine, ask your doctor if you can take an over-the-counter medicine.  
  · If you think your pain medicine is making you sick to your stomach: 
¨ Take your medicine after meals (unless your doctor has told you not to). ¨ Ask your doctor for a different pain medicine.  
  · If your doctor prescribed antibiotics, take them as directed. Do not stop taking them just because you feel better. You need to take the full course of antibiotics. Incision care 
  · If your doctor told you how to care for your cut (incision), follow your doctor's instructions. You will have a dressing over the cut. A dressing helps the incision heal and protects it. Your doctor will tell you how to take care of this.  
  · If you did not get instructions, follow this general advice: ¨ If you have strips of tape on the cut the doctor made, leave the tape on for a week or until it falls off. ¨ If you have stitches or staples, your doctor will tell you when to come back to have them removed. ¨ If you have skin adhesive on the cut, leave it on until it falls off. Skin adhesive is also called glue or liquid stitches. ¨ Change the bandage every day. ¨ Wash the area daily with warm water, and pat it dry. Don't use hydrogen peroxide or alcohol. They can slow healing. ¨ You may cover the area with a gauze bandage if it oozes fluid or rubs against clothing. ¨ You may shower 24 to 48 hours after surgery. Pat the incision dry. Don't swim or take a bath for the first 2 weeks, or until your doctor tells you it is okay. Exercise 
  · Your rehab program will include a number of exercises to do. Always do them as your therapist tells you. Ice and elevation 
  · For pain, put ice or a cold pack on the area for 10 to 20 minutes at a time. Put a thin cloth between the ice and your skin.  
  · Your ankle may swell for about 3 months. Prop up your ankle when you ice it or anytime you sit or lie down. Try to keep it above the level of your heart. This will help reduce swelling. Other instructions 
 Continue to wear your support stockings as your doctor says. These help to prevent blood clots. The length of time that you will have to wear them depends on your activity level and the amount of swelling you have. Most people wear these stockings for 4 to 6 weeks after surgery. 
 Preventing falls is also very important. To prevent falls: 
  · Arrange furniture so that you will not trip on it.  
  · Get rid of throw rugs, and move electrical cords out of the way.  
  · Walk only in areas with plenty of light.  
  · Put grab bars in showers and bathtubs.  
  · Avoid icy or snowy sidewalks.  
  · Wear shoes with sturdy, flat soles. Follow-up care is a key part of your treatment and safety. Be sure to make and go to all appointments, and call your doctor if you are having problems. It's also a good idea to know your test results and keep a list of the medicines you take. When should you call for help? Call 911 anytime you think you may need emergency care. For example, call if: 
  · You passed out (lost consciousness).  
  · You have severe trouble breathing.   · You have sudden chest pain and shortness of breath, or you cough up blood.  
 Call your doctor now or seek immediate medical care if: 
  · You have signs that your hip may be dislocated, including: ¨ Severe pain and not being able to stand. ¨ A crooked leg that looks like your hip is out of position. ¨ Not being able to bend or straighten your leg.  
  · Your leg or foot is cool or pale or changes color.  
  · You cannot feel or move your leg.  
  · You have signs of a blood clot, such as: 
¨ Pain in your calf, back of the knee, thigh, or groin. ¨ Redness and swelling in your leg or groin.  
  · Your incision comes open and begins to bleed, or the bleeding increases.  
  · You feel like your heart is racing or beating irregularly.  
  · You have signs of infection, such as: 
¨ Increased pain, swelling, warmth, or redness. ¨ Red streaks leading from the incision. ¨ Pus draining from the incision. ¨ A fever.  
 Watch closely for changes in your health, and be sure to contact your doctor if: 
  · You do not have a bowel movement after taking a laxative.  
  · You do not get better as expected. Where can you learn more? Go to http://jonatan-carlo.info/. Enter M740 in the search box to learn more about \"Hip Replacement Surgery (Posterior): What to Expect at Home. \" Current as of: November 29, 2017 Content Version: 11.8 © 8359-0463 [employer]. Care instructions adapted under license by [employer] (which disclaims liability or warranty for this information). If you have questions about a medical condition or this instruction, always ask your healthcare professional. [address] any warranty or liability for your use of this information. These are general instructions for a healthy lifestyle: No smoking/ No tobacco products/ Avoid exposure to second hand smoke Surgeon General's Warning:  Quitting smoking now greatly reduces serious risk to your health. Obesity, smoking, and sedentary lifestyle greatly increases your risk for illness A healthy diet, regular physical exercise & weight monitoring are important for maintaining a healthy lifestyle You may be retaining fluid if you have a history of heart failure or if you experience any of the following symptoms:  Weight gain of 3 pounds or more overnight or 5 pounds in a week, increased swelling in our hands or feet or shortness of breath while lying flat in bed. Please call your doctor as soon as you notice any of these symptoms; do not wait until your next office visit. Recognize signs and symptoms of STROKE: 
 
F-face looks uneven A-arms unable to move or move even S-speech slurred or non-existent T-time-call 911 as soon as signs and symptoms begin-DO NOT go Back to bed or wait to see if you get better-TIME IS BRAIN. The discharge information has been reviewed with the patient. The patient verbalized understanding. Information obtained by : 
I understand that if any problems occur once I am at home I am to contact my physician. I understand and acknowledge receipt of the instructions indicated above. Physician's or R.N.'s Signature                                                                  Date/Time Patient or Representative Signature                                                          Date/Time 
 
 
 
[employer] Transitions of Care Introducing [employer] [address] Franchesca Carvalho offers a voluntary care coordination program to provide high quality service and care to [facility] fee-for-service beneficiaries. [facility] was designed to help you enhance your health and well-being through the following services: ? Transitions of Care  support for individuals who are transitioning from one care setting to another (example: Hospital to home). ? Chronic and Complex Care Coordination  support for individuals and caregivers of those with serious or chronic illnesses or with more than one chronic (ongoing) condition and those who take a number of different medications. If you meet specific medical criteria, a [facility] [address] may call you directly to coordinate your care with your primary care physician and your other care providers. For questions about the [facility] programs, please, contact your physicians office. For general questions or additional information about Accountable Care Organizations: 
Please visit www.medicare.gov/acos. html or call 1-800-MEDICARE (6-328.784.9229) TTY users should call 0-188.738.1666. Introducing \[facility]\"" & HEALTH SERVICES! [employer] introduces Zopim patient portal. Now you can access parts of your medical record, email your doctor's office, and request medication refills online. 1. In your internet browser, go to [email or web address] 2. Click on the First Time User? Click Here link in the Sign In box. You will see the New Member Sign Up page. 3. Enter your Zopim Access Code exactly as it appears below. You will not need to use this code after youve completed the sign-up process. If you do not sign up before the expiration date, you must request a new code. · Zopim Access Code: [email or web address] Expires: 1/8/2019 10:47 AM 
 
4. Enter the last four digits of your Social Security Number (xxxx) and Date of Birth (mm/dd/yyyy) as indicated and click Submit. You will be taken to the next sign-up page. 5. Create a Cannae ID. This will be your Cannae login ID and cannot be changed, so think of one that is secure and easy to remember. 6. Create a Cannae password. You can change your password at any time. 7. Enter your Password Reset Question and Answer. This can be used at a later time if you forget your password. 8. Enter your e-mail address. You will receive e-mail notification when new information is available in [facility] 9. Click Sign Up. You can now view and download portions of your medical record. 10. Click the Download Summary menu link to download a portable copy of your medical information. If you have questions, please visit the Frequently Asked Questions section of the Cannae website. Remember, Cannae is NOT to be used for urgent needs. For medical emergencies, dial 911. Now available from your iPhone and Android! Introducing Mahesh Gama As a [facility] patient, I wanted to make you aware of our electronic visit tool called Mahesh Kdantonialanny. [facility] 24/7 allows you to connect within minutes with a medical provider 24 hours a day, seven days a week via a mobile device or tablet or logging into a secure website from your computer. You can access Mahesh Gama from anywhere in the [place]. A virtual visit might be right for you when you have a simple condition and feel like you just dont want to get out of bed, or cant get away from work for an appointment, when your regular [facility] provider is not available (evenings, weekends or holidays), or when youre out of town and need minor care. Electronic visits cost only $49 and if the [facility] 24/7 provider determines a prescription is needed to treat your condition, one can be electronically transmitted to a nearby pharmacy*. Please take a moment to enroll today if you have not already done so. The enrollment process is free and takes just a few minutes.   To enroll, please download the DigitalOcean 24/7 jd to your tablet or phone, or visit [email or web address] to enroll on your computer. And, as an [address] [place] [address] patient with a Tre[employer] account, the results of your visits will be scanned into your electronic medical record and your primary care provider will be able to view the scanned results. We urge you to continue to see your regular Lashawnin Warner provider for your ongoing medical care. And while your primary care provider may not be the one available when you seek a BIBA Apparels virtual visit, the peace of mind you get from getting a real diagnosis real time can be priceless. For more information on [facility]IB[facility] Apparels, view our Frequently Asked Questions (FAQs) at [email or web address]. Sincerely, 
 
Eduardo Flores MD 
Chief Medical Officer [employer] *:  certain medications cannot be prescribed via BIB[facility] Apparels Providers Seen During Your Hospitalization Provider Specialty Primary office phone Stas Bolden MD Orthopedic Surgery [phone] Immunizations Administered for This Admission Name Date Influenza Vaccine (Quad) PF 10/10/2018 TB Skin Test (PPD) Intradermal 10/8/2018 Your Primary Care Physician (PCP) Primary Care Physician Office Phone Office Fax Cornell Agustina, [address] [facility] [phone] You are allergic to the following Allergen Reactions Amoxicillin Rash Penicillins Other (comments) \"Mouth breaks out\" Sulfa (Sulfonamide Antibiotics) Other (comments) Recent Documentation Height Weight BMI OB Status Smoking Status 1.676 m 56.4 kg 20.05 kg/m2 Menopause Never Smoker Emergency Contacts Name Discharge Info Relation Home Work Mobile Fidel Brown  Spouse [3] [phone] Juliet Roberts  Other Relative [6] [phone] Patient Belongings The following personal items are in your possession at time of discharge: 
  Dental Appliances: None  Visual Aid: Glasses      Home Medications: None   Jewelry: None  Clothing: Other (comment)    Other Valuables: Wallet (PT'S GRAND DAUGHTER HAS VALUABLES) Please provide this summary of care documentation to your next provider. Signatures-by signing, you are acknowledging that this After Visit Summary has been reviewed with you and you have received a copy. Patient Signature:  ____________________________________________________________ Date:  ____________________________________________________________  
  
[facility] Provider Signature:  ____________________________________________________________ Date:  ____________________________________________________________

## 2018-10-08 NOTE — ANESTHESIA PROCEDURE NOTES
Spinal Block Start time: 10/8/2018 12:22 PM 
End time: 10/8/2018 12:31 PM 
Performed by: Johann Lujan Authorized by: Johann Lujan Pre-procedure: Indications: primary anesthetic  Preanesthetic Checklist: patient identified, risks and benefits discussed, anesthesia consent, patient being monitored and timeout performed Timeout Time: 12:20 Spinal Block:  
Patient Position:  Seated Prep Region:  Lumbar Prep: chlorhexidine and patient draped Location:  L2-3 Technique:  Single shot Local:  Lidocaine 1% Local Dose (mL):  2 Needle:  
Needle Type:  Pencil-tip Needle Gauge:  22 G Attempts:  3 Events: CSF confirmed, no blood with aspiration and no paresthesia Assessment: 
Insertion:  Uncomplicated Patient tolerance:  Patient tolerated the procedure well with no immediate complications

## 2018-10-08 NOTE — PROGRESS NOTES
[address] Face to Face Encounter Patients Name: Pablo Austin    YOB: 1932 Ordering Physician: Christina Fox Primary Diagnosis: Unilateral primary osteoarthritis, left hip [M16.12]  S/p left GILBERTO Date of Face to Face:   10/8/2018 Face to Face Encounter findings are related to primary reason for home care:   yes. 1. I certify that the patient needs intermittent care as follows: physical therapy: gait/stair training 2. I certify that this patient is homebound, that is: 1) patient requires the use of a walker device, special transportation, or assistance of another to leave the home; or 2) patient's condition makes leaving the home medically contraindicated; and 3) patient has a normal inability to leave the home and leaving the home requires considerable and taxing effort. Patient may leave the home for infrequent and short duration for medical reasons, and occasional absences for non-medical reasons. Homebound status is due to the following functional limitations: Patient's ambulation limited secondary to severe pain and requires the use of an assistive device and the assistance of a caregiver for safe completion. Patient with strength and ROM deficits limiting ambulation endurance requiring the use of an assistive device and the assistance of a caregiver. Patient deemed temporarily homebound secondary to increased risk for infection when leaving home and going out into the community. 3. I certify that this patient is under my care and that I, or a nurse practitioner or  [id number], or clinical nurse specialist, or certified nurse midwife, working with me, had a Face-to-Face Encounter that meets the physician Face-to-Face Encounter requirements. The following are the clinical findings from the [address] [place] [address] encounter that support the need for skilled services and is a summary of the encounter: see hospital chart DYLAN Paez 
10/8/2018 THE FOLLOWING TO BE COMPLETED BY THE COMMUNITY PHYSICIAN: 
 
I concur with the findings described above from the F2F encounter that this patient is homebound and in need of a skilled service. Certifying Physician: _____________________________________ Printed Certifying Physician Name: _____________________________________ Date: _________________

## 2018-10-08 NOTE — PROGRESS NOTES
.Care Management Interventions Mode of Transport at Discharge: Self Transition of Care Consult (CM Consult): Home Health [address]: Yes Physical Therapy Consult: Yes Occupational Therapy Consult: Yes Current Support Network: Lives with Spouse Confirm Follow Up Transport: Family Plan discussed with Pt/Family/Caregiver: Yes Freedom of Choice Offered: Yes Discharge Location Discharge Placement: Home with home health Patient is a 80y.o. year old female admitted for Left GILBERTO . Patient lives with Her spouse and plans to return home on discharge. Order received to arrange home health. Patient without preference towards agency. Referral sent to [facility]. Patient denies any equipment needs as she has a walker and raised toilet seat. Will fo  llow until discharge.

## 2018-10-08 NOTE — ANESTHESIA POSTPROCEDURE EVALUATION
Post-Anesthesia Evaluation and Assessment Patient: Tana Harrington MRN: [id number]  SSN: [id number] YOB: 1932  Age: 80 y.o. Sex: female Cardiovascular Function/Vital Signs Visit Vitals  /55  Pulse 88  Temp 36.5 °C (97.7 °F)  Resp 14  
 Ht 5' 6\" (1.676 m)  Wt 56.4 kg (124 lb 4 oz)  SpO2 100%  BMI 20.05 kg/m2 Patient is status post spinal anesthesia for Procedure(s): LEFT HIP ARTHROPLASTY TOTAL . Nausea/Vomiting: None Postoperative hydration reviewed and adequate. Pain: 
Pain Scale 1: Visual (10/08/18 1441) Pain Intensity 1: 0 (10/08/18 1441) Managed Neurological Status:  
Neuro (WDL): Within Defined Limits (10/08/18 1426) Neuro LLE Motor Response: Pharmacologically paralyzed;Numbness (10/08/18 1426) RLE Motor Response: Numbness; Pharmacologically paralyzed (10/08/18 1426) At baseline Mental Status and Level of Consciousness: Arousable Pulmonary Status:  
O2 Device: Nasal cannula (10/08/18 1426) Adequate oxygenation and airway patent Complications related to anesthesia: None Post-anesthesia assessment completed. No concerns Signed By: Analisa Jean MD   
 October 8, 2018

## 2018-10-09 LAB
ANION GAP SERPL CALC-SCNC: 11 MMOL/L
BUN SERPL-MCNC: 12 MG/DL (ref 8–23)
CALCIUM SERPL-MCNC: 8 MG/DL (ref 8.3–10.4)
CHLORIDE SERPL-SCNC: 94 MMOL/L (ref 98–107)
CO2 SERPL-SCNC: 27 MMOL/L (ref 21–32)
CREAT SERPL-MCNC: 0.85 MG/DL (ref 0.6–1)
GLUCOSE SERPL-MCNC: 119 MG/DL (ref 65–100)
HGB BLD-MCNC: 7.7 G/DL (ref 11.7–15.4)
POTASSIUM SERPL-SCNC: 3 MMOL/L (ref 3.5–5.1)
SODIUM SERPL-SCNC: 132 MMOL/L (ref 136–145)

## 2018-10-09 PROCEDURE — 80048 BASIC METABOLIC PNL TOTAL CA: CPT

## 2018-10-09 PROCEDURE — 97161 PT EVAL LOW COMPLEX 20 MIN: CPT

## 2018-10-09 PROCEDURE — 97165 OT EVAL LOW COMPLEX 30 MIN: CPT

## 2018-10-09 PROCEDURE — 97150 GROUP THERAPEUTIC PROCEDURES: CPT

## 2018-10-09 PROCEDURE — 36415 COLL VENOUS BLD VENIPUNCTURE: CPT

## 2018-10-09 PROCEDURE — 97110 THERAPEUTIC EXERCISES: CPT

## 2018-10-09 PROCEDURE — 94760 N-INVAS EAR/PLS OXIMETRY 1: CPT

## 2018-10-09 PROCEDURE — 65270000029 HC RM PRIVATE

## 2018-10-09 PROCEDURE — 74011250636 HC RX REV CODE- 250/636: Performed by: ORTHOPAEDIC SURGERY

## 2018-10-09 PROCEDURE — 97116 GAIT TRAINING THERAPY: CPT

## 2018-10-09 PROCEDURE — 74011250637 HC RX REV CODE- 250/637: Performed by: ORTHOPAEDIC SURGERY

## 2018-10-09 PROCEDURE — 85018 HEMOGLOBIN: CPT

## 2018-10-09 PROCEDURE — 97535 SELF CARE MNGMENT TRAINING: CPT

## 2018-10-09 PROCEDURE — 94762 N-INVAS EAR/PLS OXIMTRY CONT: CPT

## 2018-10-09 RX ORDER — HYDROCODONE BITARTRATE AND ACETAMINOPHEN 5; 325 MG/1; MG/1
1 TABLET ORAL
Qty: 42 TAB | Refills: 0 | Status: SHIPPED | OUTPATIENT
Start: 2018-10-09 | End: 2018-11-27

## 2018-10-09 RX ORDER — AMOXICILLIN 250 MG
2 CAPSULE ORAL DAILY
Qty: 120 TAB | Refills: 0 | Status: SHIPPED | OUTPATIENT
Start: 2018-10-09 | End: 2019-01-15 | Stop reason: SDUPTHER

## 2018-10-09 RX ORDER — ACETAMINOPHEN 500 MG
1000 TABLET ORAL EVERY 6 HOURS
Qty: 120 TAB | Refills: 0 | Status: SHIPPED | OUTPATIENT
Start: 2018-10-09 | End: 2019-10-08

## 2018-10-09 RX ORDER — ONDANSETRON 8 MG/1
8 TABLET, ORALLY DISINTEGRATING ORAL
Qty: 60 TAB | Refills: 0 | Status: SHIPPED | OUTPATIENT
Start: 2018-10-09 | End: 2019-01-06

## 2018-10-09 RX ORDER — ASPIRIN 81 MG/1
81 TABLET ORAL EVERY 12 HOURS
Qty: 60 TAB | Refills: 0 | Status: SHIPPED | OUTPATIENT
Start: 2018-10-09 | End: 2019-01-06

## 2018-10-09 RX ADMIN — ACETAMINOPHEN 1000 MG: 500 TABLET, FILM COATED ORAL at 09:23

## 2018-10-09 RX ADMIN — Medication 1 AMPULE: at 08:11

## 2018-10-09 RX ADMIN — SENNOSIDES AND DOCUSATE SODIUM 2 TABLET: 8.6; 5 TABLET ORAL at 08:09

## 2018-10-09 RX ADMIN — ASPIRIN 81 MG: 81 TABLET, COATED ORAL at 08:09

## 2018-10-09 RX ADMIN — ACETAMINOPHEN 1000 MG: 500 TABLET, FILM COATED ORAL at 17:43

## 2018-10-09 RX ADMIN — Medication 1 AMPULE: at 21:18

## 2018-10-09 RX ADMIN — LORAZEPAM 1 MG: 1 TABLET ORAL at 21:18

## 2018-10-09 RX ADMIN — SERTRALINE 50 MG: 50 TABLET, FILM COATED ORAL at 08:09

## 2018-10-09 RX ADMIN — ACETAMINOPHEN 1000 MG: 500 TABLET, FILM COATED ORAL at 05:00

## 2018-10-09 RX ADMIN — ASPIRIN 81 MG: 81 TABLET, COATED ORAL at 21:18

## 2018-10-09 RX ADMIN — SIMVASTATIN 40 MG: 40 TABLET, FILM COATED ORAL at 21:18

## 2018-10-09 RX ADMIN — Medication 2 G: at 05:01

## 2018-10-09 NOTE — PROGRESS NOTES
Problem: Falls - Risk of 
Goal: *Absence of Falls Document Sven Kins Fall Risk and appropriate interventions in the flowsheet. Outcome: Progressing Towards Goal 
Fall Risk Interventions: 
Mobility Interventions: Communicate number of staff needed for ambulation/transfer, OT consult for ADLs, Patient to call before getting OOB, PT Consult for mobility concerns Medication Interventions: Evaluate medications/consider consulting pharmacy, Patient to call before getting OOB, Teach patient to arise slowly Elimination Interventions: Patient to call for help with toileting needs, Toilet paper/wipes in reach

## 2018-10-09 NOTE — PROGRESS NOTES
Problem: Mobility Impaired (Adult and Pediatric) Goal: *Acute Goals and Plan of Care (Insert Text) GOALS (1-4 days): 
(1.)Ms. Catrina Dai will move from supine to sit and sit to supine  in bed with STAND BY ASSIST.   
(2.)Ms. Catrina Dai will transfer from bed to chair and chair to bed with STAND BY ASSIST using the least restrictive device. (3.)Ms. Catrina Dai will ambulate with STAND BY ASSIST for 150 feet with the least restrictive device. (4.)Ms. Catrina Dai will ambulate up/down 3 steps with bilateral  railing with MINIMAL ASSIST with no device. (5.)Ms. Catrina aDi will state/observe KASSIE precautions with 0 verbal cues. ________________________________________________________________________________________________ PHYSICAL THERAPY Joint camp Kassie: Initial Assessment, Treatment Day: Day of Assessment, AM 10/9/2018 INPATIENT: Hospital Day: 2 [place] / [place] / Product Type: Medicare /  
  
NAME/AGE/GENDER: Shailesh Rider is a 80 y.o. female PRIMARY DIAGNOSIS:  Unilateral primary osteoarthritis, left hip [M16.12] Procedure(s) and Anesthesia Type: 
   * LEFT HIP ARTHROPLASTY TOTAL  - Spinal (Left) ICD-10: Treatment Diagnosis:  
 · Pain in left hip (M25.552) · Stiffness of Left Hip, Not elsewhere classified (M25.652) · Difficulty in walking, Not elsewhere classified (R26.2) ASSESSMENT:  
 
Ms. Catrina Dai presents supine on contact and agreeable to get up with therapy. Pt presents with decreased strength and range of motion left lower extremity and decreased independence with functional mobility s/p left KASSIE. She will benefit from skilled PT interventions to maximize independence with functional mobility and KASSIE exercises. Pt needing minimal assist for bed mobility, transfers and gait. Unsteady on her feet and easily distracted. Her left leg has a tendency to stay turned in. Very pleasant lady who follows all commands.  In chair worked on [employer] exercises with verbal cues and manual cues. Cues also needed to stay focused. Pt left up in chair with family in room and chair alarm on. Hope to progress this afternoon. This section established at most recent assessment PROBLEM LIST (Impairments causing functional limitations): 1. Decreased Strength 2. Decreased ADL/Functional Activities 3. Decreased Transfer Abilities 4. Decreased Ambulation Ability/Technique 5. Increased Pain 6. Decreased Flexibility/Joint Mobility 7. Decreased Knowledge of Precautions 8. Decreased [place] with Home Exercise Program 
 INTERVENTIONS PLANNED: (Benefits and precautions of physical therapy have been discussed with the patient.) 1. Bed Mobility 2. Cold 3. Gait Training 4. Home Exercise Program (HEP) 5. Therapeutic Exercise/Strengthening 6. Transfer Training 7. Range of Motion: active/assisted/passive 8. Therapeutic Activities 9. Group Therapy TREATMENT PLAN: Frequency/Duration: Follow patient BID for duration of hospital stay to address above goals. Rehabilitation Potential For Stated Goals: Good RECOMMENDED REHABILITATION/EQUIPMENT: (at time of discharge pending progress): Continue Skilled Therapy and Home Health: Physical Therapy. HISTORY:  
History of Present Injury/Illness (Reason for Referral): 
Pt s/p left GILBERTO on 10/9/18 Past Medical History/Comorbidities:  
Ms. Neyda Jaramillo  has a past medical history of Anxiety; Decreased mobility; Edema; GERD (gastroesophageal reflux disease); [place] (hard of hearing); echocardiogram (01/04/2017); Hyperlipidemia; Hypertension; Hypothyroid; Osteoarthritis; and Pneumonia (2003). Ms. Neyda Jaramillo  has a past surgical history that includes hx other surgical (2013) and hx hip replacement (Right, 2003). Social History/Living Environment:  
Home Environment: Private residence # Steps to Enter: 3 Rails to Enter: Yes One/Two Story Residence: One story Living Alone: No 
Support Systems: Family member(s) Patient Expects to be Discharged to[de-identified] Private residence Current DME Used/Available at Home: Dennise Kiss, straight, Commode, bedside, Walker, [employer] Tub or Shower Type: Tub/Shower combination Prior Level of Function/Work/Activity: 
Pt needing some assist for her ADLS and for mobility. Plans to go home with daughter and son in law support Number of Personal Factors/Comorbidities that affect the Plan of Care: 0: LOW COMPLEXITY EXAMINATION:  
Most Recent Physical Functioning:  
Gross Assessment: Yes Gross Assessment AROM: Generally decreased, functional (left lower extremity, s/p GILBERTO) Strength: Generally decreased, functional (left lower extremity, s/p GILBERTO) Bed Mobility Supine to Sit: Contact guard assistance;Minimum assistance Sit to Supine:  (stayed up in chair) Transfers Sit to Stand: Minimum assistance Stand to Sit: Minimum assistance Balance Sitting: Intact Standing: Pull to stand; With support    Posture Posture (WDL): Exceptions to [facility] Posture Assessment: Forward head;Kyphosis;Rounded shoulders Weight Bearing Status Left Side Weight Bearing: As tolerated Distance (ft): 15 Feet (ft) Ambulation - Level of Assistance: Minimal assistance Assistive Device: Walker, rolling Speed/Anne: Pace decreased (<100 feet/min); Shuffled Step Length: Right shortened Stance: Left decreased Gait Abnormalities: Antalgic;Decreased step clearance;Shuffling gait; Step to gait;Trunk sway increased Interventions: Safety awareness training;Verbal cues Braces/Orthotics: none Left Hip Cold Type: Cold/ice pack Patient Position: Sitting Body Structures Involved: 1. Joints 2. Muscles Body Functions Affected: 1. Movement Related Activities and Participation Affected: 1. Mobility 2. Self Care Number of elements that affect the Plan of Care: 4+: HIGH COMPLEXITY CLINICAL PRESENTATION:  
Presentation: Stable and uncomplicated: LOW COMPLEXITY CLINICAL DECISION MAKING:  
 [facility] Basic Mobility Inpatient Short Form How much difficulty does the patient currently have. .. Unable A Lot A Little None 1. Turning over in bed (including adjusting bedclothes, sheets and blankets)? [] 1   [] 2   [x] 3   [] 4  
2. Sitting down on and standing up from a chair with arms ( e.g., wheelchair, bedside commode, etc.)   [] 1   [] 2   [x] 3   [] 4  
3. Moving from lying on back to sitting on the side of the bed? [] 1   [] 2   [x] 3   [] 4 How much help from another person does the patient currently need. .. Total A Lot A Little None 4. Moving to and from a bed to a chair (including a wheelchair)? [] 1   [] 2   [x] 3   [] 4  
5. Need to walk in hospital room? [] 1   [] 2   [x] 3   [] 4  
6. Climbing 3-5 steps with a railing? [] 1   [x] 2   [] 3   [] 4  
© 2007, Trustees of [address] [place] [address], under license to [employer]. All rights reserved Score:  Initial: 17 Most Recent: X (Date: -- ) Interpretation of Tool:  Represents activities that are increasingly more difficult (i.e. Bed mobility, Transfers, Gait). Score 24 23 22-20 19-15 14-10 9-7 6 Modifier CH CI CJ CK CL CM CN   
 
? Mobility - Walking and Moving Around:  
  - CURRENT STATUS: CK - 40%-59% impaired, limited or restricted  - GOAL STATUS: CI - 1%-19% impaired, limited or restricted  - D/C STATUS:  ---------------To be determined--------------- [place] / [place] B / Product Type: Medicare /   
 
Medical Necessity:    
· Patient is expected to demonstrate progress in strength, range of motion, coordination and functional technique to decrease assistance required with functional mobility and GILBERTO exercises. Reason for Services/Other Comments: 
· Patient continues to require skilled intervention due to inability to complete functional mobility and GILBERTO Exercises independently. Use of outcome tool(s) and clinical judgement create a POC that gives a: Clear prediction of patient's progress: LOW COMPLEXITY  
  
 
 
 
TREATMENT:  
(In addition to Assessment/Re-Assessment sessions the following treatments were rendered) Pre-treatment Symptoms/Complaints:  none Pain Initial:  
Pain Intensity 1: 0  Post Session:  No reports of pain Therapeutic Exercise: (10 Minutes):  Exercises per grid below to improve mobility and strength. Required minimal verbal and manual cues to promote proper body alignment and promote proper body posture. Progressed repetitions as indicated. Date: 
10/9/18 Date: 
 Date: 
  
ACTIVITY/EXERCISE AM PM AM PM AM PM  
GROUP THERAPY  []  []  []  []  []  [] Ankle Pumps 10 Quad Sets 10 Gluteal Sets 10 Hip ABd/ADduction 10 Straight Leg Raises Knee Slides 10 Short Arc The [address] 10 Chair Slides B = bilateral; AA = active assistive; A = active; P = passive Treatment/Session Assessment:   
 Response to Treatment:  Tolerated well. Education: 
[] Home Exercises [x] Fall Precautions [] Hip Precautions [] D/C Instruction Review 
[] Knee/Hip Prosthesis Review 
[] Walker Management/Safety [] Adaptive Equipment as Needed Interdisciplinary Collaboration:  
o Registered Nurse After treatment position/precautions:  
o Up in chair 
o Bed alarm/tab alert on 
o Bed/Chair-wheels locked 
o Call light within reach 
o Family at bedside Compliance with Program/Exercises: compliant all of the time. Recommendations/Intent for next treatment session:  Treatment next visit will focus on increasing Ms. Brown's independence with bed mobility, transfers, gait training, strength/ROM exercises, modalities for pain, and patient education. Total Treatment Duration: PT Patient Time In/Time Out Time In: 0935 Time Out: 1000 Lui Greenwood PT

## 2018-10-09 NOTE — PROGRESS NOTES
Follow-up visit to continue support. Offered prayer as requested. Marita Sherman MDiv Board Certified [address] [place] [address]

## 2018-10-09 NOTE — PROGRESS NOTES
2018 Post Op day: 1 Day Post-Op Admit Date: 10/8/2018 Admit Diagnosis: Unilateral primary osteoarthritis, left hip [M16.12] Subjective: Patient stable. No acute events. Objective:  
Visit Vitals  /58 (BP 1 Location: Right arm, BP Patient Position: At rest)  Pulse 91  Temp 98.2 °F (36.8 °C)  Resp 16  
 Ht 5' 6\" (1.676 m)  Wt 56.4 kg (124 lb 4 oz)  SpO2 98%  BMI 20.05 kg/m2 Temp (24hrs), Av.5 °F (36.4 °C), Min:96.3 °F (35.7 °C), Max:98.2 °F (36.8 °C) Lab Results Component Value Date/Time HGB 7.7 (L) 10/09/2018 04:20 AM  
 
Extremity Exam 
Dressing clean and dry Tibialis Anterior and Gastroc-Soleus functioning normally left lower extremity Sensation intact to light touch on operative limb Extremity perfused TEDS/SCDS in place No sign of DVT Assessment / Plan : 
WBAT LLE Posterior hip precautions Continue PT/OT Continue current DVT prophylaxis in house. Discharge on ASA BID DIspo-pending Patient Active Problem List  
Diagnosis Code  Anxiety and depression F41.9, F32.9  Hyponatremia E87.1  Hypokalemia E87.6  Vitamin B 12 deficiency E53.8  Hyperlipidemia E78.5  Other vitamin B12 deficiency anemia D51.8  Decreased ambulation status Z74.09  
 Chronic low back pain with sciatica M54.40, G89.29  
 Poor tolerance for ambulation Z78.9  Arthralgia M25.50  Bilateral edema of lower extremity R60.0  Precordial pain R07.2  Localized edema R60.0  Diabetes mellitus without complication ([facility] [address] A67.4  Type 2 diabetes with nephropathy (HCC) E11.21  
 OA (osteoarthritis) of hip M16.9 Signed By: Yolanda Newman MD  
 
I have reviewed the patients controlled substance prescription history, as maintained in the [place] prescription monitoring program, so that the prescription(s) for a  controlled substance can be given.

## 2018-10-09 NOTE — PROGRESS NOTES
Problem: Mobility Impaired (Adult and Pediatric) Goal: *Acute Goals and Plan of Care (Insert Text) GOALS (1-4 days): 
(1.)Ms. Nadira Bautista will move from supine to sit and sit to supine  in bed with STAND BY ASSIST.   
(2.)Ms. Nadira Bautista will transfer from bed to chair and chair to bed with STAND BY ASSIST using the least restrictive device. (3.)Ms. Nadira Bautista will ambulate with STAND BY ASSIST for 150 feet with the least restrictive device. (4.)Ms. Nadira Bautisat will ambulate up/down 3 steps with bilateral  railing with MINIMAL ASSIST with no device. (5.)Ms. Nadira Bautista will state/observe KASSIE precautions with 0 verbal cues. ________________________________________________________________________________________________ PHYSICAL THERAPY [facility] [place]: Daily Note, Treatment Day: Day of Assessment, PM 10/9/2018 INPATIENT: Hospital Day: 2 [place] / Product Type: Medicare /  
  
NAME/AGE/GENDER: Ge Chappell is a 80 y.o. female PRIMARY DIAGNOSIS:  Unilateral primary osteoarthritis, left hip [M16.12] Procedure(s) and Anesthesia Type: 
   * LEFT HIP ARTHROPLASTY TOTAL  - Spinal (Left) ICD-10: Treatment Diagnosis:  
 · Pain in left hip (M25.552) · Stiffness of Left Hip, Not elsewhere classified (M25.652) · Difficulty in walking, Not elsewhere classified (R26.2) ASSESSMENT:  
 
Ms. Nadira Bautista presents up in chair on contact and agreeable to therapy. Granddaughter and spouse in room, shared with them what OT shared with PT-that pt lost her balance several times in the bathroom and that she will need some help at home. They stated they have a shower chair and explained to family member that a shower chair was used with OT. Granddaughter said she lives next door and that she could come over and help. Pt worked on gait training in the collier making great progress with distance. Lots of cues to keep walker close and cues for safety.  In gym worked on [employer] exercises with verbal cues and manual cues progressing with repetitions from this morning. Pt rolled back to room and stayed up in chair with ice by hip. Needs in reach and chair alarm in place. Hope to progress in the morning. This section established at most recent assessment PROBLEM LIST (Impairments causing functional limitations): 1. Decreased Strength 2. Decreased ADL/Functional Activities 3. Decreased Transfer Abilities 4. Decreased Ambulation Ability/Technique 5. Increased Pain 6. Decreased Flexibility/Joint Mobility 7. Decreased Knowledge of Precautions 8. Decreased [place] with Home Exercise Program 
 INTERVENTIONS PLANNED: (Benefits and precautions of physical therapy have been discussed with the patient.) 1. Bed Mobility 2. Cold 3. Gait Training 4. Home Exercise Program (HEP) 5. Therapeutic Exercise/Strengthening 6. Transfer Training 7. Range of Motion: active/assisted/passive 8. Therapeutic Activities 9. Group Therapy TREATMENT PLAN: Frequency/Duration: Follow patient BID for duration of hospital stay to address above goals. Rehabilitation Potential For Stated Goals: Good RECOMMENDED REHABILITATION/EQUIPMENT: (at time of discharge pending progress): Continue Skilled Therapy and Home Health: Physical Therapy. HISTORY:  
History of Present Injury/Illness (Reason for Referral): 
Pt s/p left GILBERTO on 10/9/18 Past Medical History/Comorbidities:  
Ms. Danielle Heck  has a past medical history of Anxiety; Decreased mobility; Edema; GERD (gastroesophageal reflux disease); [place] (hard of hearing); echocardiogram (01/04/2017); Hyperlipidemia; Hypertension; Hypothyroid; Osteoarthritis; and Pneumonia (2003). Ms. Danielle Heck  has a past surgical history that includes hx other surgical (2013) and hx hip replacement (Right, 2003). Social History/Living Environment:  
Home Environment: Private residence # Steps to Enter: 3 Rails to Enter:  Yes 
 One/Two Story Residence: One story Living Alone: No 
Support Systems: Family member(s) Patient Expects to be Discharged to[de-identified] Private residence Current DME Used/Available at Home: Priscila Ann, luis daniel, Commode, bedside, Walker, [employer] Tub or Shower Type: Tub/Shower combination Prior Level of Function/Work/Activity: 
Pt needing some assist for her ADLS and for mobility. Plans to go home with daughter and son in law support Number of Personal Factors/Comorbidities that affect the Plan of Care: 0: LOW COMPLEXITY EXAMINATION:  
Most Recent Physical Functioning:  
Gross Assessment: Yes Gross Assessment AROM: Generally decreased, functional (left lower extremity, s/p GILBERTO) Strength: Generally decreased, functional (left lower extremity, s/p GILBERTO) Bed Mobility Supine to Sit: Contact guard assistance;Minimum assistance Sit to Supine:  (stayed up in chair) Transfers Sit to Stand: Minimum assistance Stand to Sit: Minimum assistance Bed to Chair: Minimum assistance Balance Sitting: Intact Standing: Pull to stand; With support    Posture Posture (WDL): Exceptions to [facility] Posture Assessment: Forward head;Kyphosis;Rounded shoulders Gait Training: Yes 
 
Weight Bearing Status Left Side Weight Bearing: As tolerated Distance (ft): 164 Feet (ft) Ambulation - Level of Assistance: Minimal assistance Assistive Device: Walker, rolling Speed/Anne: Pace decreased (<100 feet/min) Step Length: Right shortened Stance: Left decreased Gait Abnormalities: Antalgic;Decreased step clearance Interventions: Safety awareness training;Verbal cues Braces/Orthotics: none Left Hip Cold Type: Cold/ice pack Patient Position: Sitting Body Structures Involved: 1. Joints 2. Muscles Body Functions Affected: 1. Movement Related Activities and Participation Affected: 1. Mobility 2. Self Care Number of elements that affect the Plan of Care: 4+: HIGH COMPLEXITY CLINICAL PRESENTATION:  
 Presentation: Stable and uncomplicated: LOW COMPLEXITY CLINICAL DECISION MAKING:  
[facility] AM-PAC 6 Clicks Basic Mobility Inpatient Short Form How much difficulty does the patient currently have. .. Unable A Lot A Little None 1. Turning over in bed (including adjusting bedclothes, sheets and blankets)? [] 1   [] 2   [x] 3   [] 4  
2. Sitting down on and standing up from a chair with arms ( e.g., wheelchair, bedside commode, etc.)   [] 1   [] 2   [x] 3   [] 4  
3. Moving from lying on back to sitting on the side of the bed? [] 1   [] 2   [x] 3   [] 4 How much help from another person does the patient currently need. .. Total A Lot A Little None 4. Moving to and from a bed to a chair (including a wheelchair)? [] 1   [] 2   [x] 3   [] 4  
5. Need to walk in hospital room? [] 1   [] 2   [x] 3   [] 4  
6. Climbing 3-5 steps with a railing? [] 1   [x] 2   [] 3   [] 4  
© 2007, Trustees of [facility], under license to [place]. All rights reserved Score:  Initial: 17 Most Recent: X (Date: -- ) Interpretation of Tool:  Represents activities that are increasingly more difficult (i.e. Bed mobility, Transfers, Gait). Score 24 23 22-20 19-15 14-10 9-7 6 Modifier CH CI CJ CK CL CM CN   
 
? Mobility - Walking and Moving Around:  
  - CURRENT STATUS: CK - 40%-59% impaired, limited or restricted  - GOAL STATUS: CI - 1%-19% impaired, limited or restricted  - D/C STATUS:  ---------------To be determined--------------- [place] / [place] / Product Type: Medicare /   
 
Medical Necessity:    
· Patient is expected to demonstrate progress in strength, range of motion, coordination and functional technique to decrease assistance required with functional mobility and GILBERTO exercises.  
Reason for Services/Other Comments: 
· Patient continues to require skilled intervention due to inability to complete functional mobility and GILBERTO Exercises independently. Use of outcome tool(s) and clinical judgement create a POC that gives a: Clear prediction of patient's progress: LOW COMPLEXITY  
  
 
 
 
TREATMENT:  
(In addition to Assessment/Re-Assessment sessions the following treatments were rendered) Pre-treatment Symptoms/Complaints:  none Pain Initial:  
Pain Intensity 1: 0  Post Session:  No reports of pain Therapeutic Exercise: (45 Minutes (group)):  Exercises per grid below to improve mobility and strength. Required minimal verbal and manual cues to promote proper body alignment and promote proper body posture. Progressed repetitions as indicated. Gait Training (15 Minutes):  Gait training to improve and/or restore physical functioning as related to mobility and strength. Ambulated 164 Feet (ft) with Minimal assistance using a Walker, rolling and minimal Safety awareness training;Verbal cues related to their stance phase and stride length to promote proper body alignment and promote proper body posture. Instruction in performance of walker use and safety awareness to correct stance phase and stride length. Date: 
10/9/18 Date: 
 Date: 
  
ACTIVITY/EXERCISE AM PM AM PM AM PM  
GROUP THERAPY  []  [x]  []  []  []  [] Ankle Pumps 10 15 Quad Sets 10 15 Gluteal Sets 10 15 Hip ABd/ADduction 10 15 Straight Leg Raises Knee Slides 10 15 Short Arc Quads  15 [address] 10 15 Chair Slides B = bilateral; AA = active assistive; A = active; P = passive Treatment/Session Assessment:   
 Response to Treatment:  Tolerated well, progressing nicely, cues for safety Education: 
[x] Home Exercises [x] Fall Precautions [] Hip Precautions [] D/C Instruction Review [x] Hip Prosthesis Review [x] Walker Management/Safety [] Adaptive Equipment as Needed Interdisciplinary Collaboration:  
o Registered Nurse o Rehabilitation Attendant After treatment position/precautions:  
o Up in chair 
o Bed alarm/tab alert on 
o Bed/Chair-wheels locked 
o Call light within reach 
o Family at bedside Compliance with Program/Exercises: compliant all of the time. Recommendations/Intent for next treatment session:  Treatment next visit will focus on increasing Ms. Brown's independence with bed mobility, transfers, gait training, strength/ROM exercises, modalities for pain, and patient education. Total Treatment Duration: PT Patient Time In/Time Out Time In: 1300 Time Out: 1400 Sang Vallejo PT

## 2018-10-09 NOTE — PROGRESS NOTES
Initial visit by  to convey care and concern and encourage patient that  services are available if desired. Abbreviated visit to allow patient care. Follow-up is planned as requested. Randi Gilbert MDiv Board Certified [address] [place] [address]

## 2018-10-09 NOTE — PROGRESS NOTES
Patient A/O x 2-3 mild pleasant confusion, some forgetfulness repeats herself and [place]. Removed Bulky ABD dressing from left hip to expose the prineo underneath, left open to air.

## 2018-10-09 NOTE — PROGRESS NOTES
Problem: Self Care Deficits Care Plan (Adult) Goal: *Acute Goals and Plan of Care (Insert Text) GOALS:  
DISCHARGE GOALS (in preparation for going home/rehab):  3 days 1. Ms. Amy Sanchez will perform one lower body dressing activity with minimal assistance with adaptive equipment to demonstrate improved functional mobility and safety. 2.  Ms. Amy Sanchez will perform one lower body bathing activity with minimal  assistance with adaptive equipment to demonstrate improved functional mobility and safety. GOAL MET 10/9/2018 3. Ms. Amy Sanchez will perform toileting/toilet transfer with contact guard assistance with adaptive equipment to demonstrate improved functional mobility and safety. 4.  Ms. Amy Sanchez will perform shower transfer with contact guard assistance with adaptive equipment to demonstrate improved functional mobility and safety. 5.  Ms. Amy Sanchez will state GILBERTO precautions with two verbal cues to demonstrate improved functional mobility and safety. JOINT CAMP OCCUPATIONAL THERAPY TKA: Initial Assessment, Daily Note and Treatment Day: Day of Assessment 10/9/2018 INPATIENT: Hospital Day: 2 [place] / Product Type: Medicare /  
  
NAME/AGE/GENDER: Aicha De is a 80 y.o. female PRIMARY DIAGNOSIS:  Unilateral primary osteoarthritis, left hip [M16.12] Procedure(s) and Anesthesia Type: 
   * LEFT HIP ARTHROPLASTY TOTAL  - Spinal (Left) ICD-10: Treatment Diagnosis:  
 · Pain in left hip (M25.552) · Stiffness of Left Hip, Not elsewhere classified (M25.652) ASSESSMENT:  
 
Ms. Amy Sanchez is s/p left GILBERTO and presents with decreased weight bearing on left LE and decreased independence with functional mobility and activities of daily living.   Patient completed shower and dressing as charted below in ADL grid and is ambulating with rolling walker and contact guard  assist.  Patient has met 1/5 goals and plans to return home with good family support. Pt at least 3 loss of balance during self care that without assistance could have resulted in a fall. Discussed with PT who was going to address issue with family for increased support at home. Family able to provide patient with appropriate level of assistance at this time as long as they are willing to stay with her and her elderly . OT reviewed hip precautions throughout session. Patient instructed to call for assistance when needing to get up from recliner and alarm engaged and all needs in reach. Patient verbalized understanding of call light. This section established at most recent assessment PROBLEM LIST (Impairments causing functional limitations): 1. Decreased Strength 2. Decreased ADL/Functional Activities 3. Decreased Transfer Abilities 4. Increased Pain 5. Increased Fatigue 6. Decreased Flexibility/Joint Mobility 7. Decreased Knowledge of Precautions INTERVENTIONS PLANNED: (Benefits and precautions of occupational therapy have been discussed with the patient.) 1. Activities of daily living training 2. Adaptive equipment training 3. Balance training 4. Clothing management 5. Donning&doffing training 6. Theraputic activity TREATMENT PLAN: Frequency/Duration: Follow patient 1-2 times to address above goals. Rehabilitation Potential For Stated Goals: Good RECOMMENDED REHABILITATION/EQUIPMENT: (at time of discharge pending progress): Continue Skilled Therapy and Home Health: Physical Therapy. With family support OCCUPATIONAL PROFILE AND HISTORY:  
History of Present Injury/Illness (Reason for Referral): Pt presents this date s/p (left) TKA. Past Medical History/Comorbidities:  
Ms. Kwasi Price  has a past medical history of Anxiety; Decreased mobility; Edema; GERD (gastroesophageal reflux disease); [place] (hard of hearing); echocardiogram (01/04/2017); Hyperlipidemia; Hypertension;  Hypothyroid; Osteoarthritis; and Pneumonia (2003). Ms. Yang Best  has a past surgical history that includes hx other surgical (2013) and hx hip replacement (Right, 2003). Social History/Living Environment:  
Home Environment: Private residence # Steps to Enter: 3 Rails to Enter: Yes One/Two Story Residence: One story Living Alone: No 
Support Systems: Family member(s) Patient Expects to be Discharged to[de-identified] Private residence Current DME Used/Available at Home: [employer], straight, Com[employer], bedside, Walker, [employer] Tub or Shower Type: Tub/Shower combination Prior Level of Function/Work/Activity: 
Independent per patient and granddaughter  assistance as needed. Number of Personal Factors/Comorbidities that affect the Plan of Care: Brief history (0):  LOW COMPLEXITY ASSESSMENT OF OCCUPATIONAL PERFORMANCE[de-identified]  
Most Recent Physical Functioning:  
Balance Sitting: Intact Standing: Pull to stand; With support Gross Assessment: Yes Gross Assessment AROM: Generally decreased, functional (left lower extremity, s/p GILBERTO) Strength: Generally decreased, functional (left lower extremity, s/p GILBERTO) Coordination Fine Motor Skills-Upper: Left Intact; Right Intact (for age and condition) Gross Motor Skills-Upper: Left Intact; Right Intact (for age and condition ) Mental Status Neurologic State: Alert Orientation Level: Oriented to person;Oriented to place Cognition: Appropriate for age attention/concentration Perception: Appears intact Perseveration: No perseveration noted Safety/Judgement: Fall prevention Basic ADLs (From Assessment) Complex ADLs (From Assessment) Basic ADL Feeding: Independent Oral Facial Hygiene/Grooming: Supervision (seated in recliner) Bathing: Minimum assistance (seated on shower chair) Type of Bath: Chlorhexidine (CHG), Full, Shower Upper Body Dressing: Minimum assistance Lower Body Dressing: Moderate assistance Toileting: Contact guard assistance Grooming/Bathing/Dressing Activities of Daily Living Cognitive Retraining Safety/Judgement: Fall prevention Functional Transfers Bathroom Mobility: Minimum assistance Toilet Transfer : Minimum assistance Shower Transfer: Minimum assistance Bed/Mat Mobility Supine to Sit: Contact guard assistance;Minimum assistance Sit to Supine:  (stayed up in chair) Sit to Stand: Minimum assistance Bed to Chair: Minimum assistance Physical Skills Involved: 1. Range of Motion 2. Balance 3. Strength Cognitive Skills Affected (resulting in the inability to perform in a timely and safe manner): 1. none Psychosocial Skills Affected: 1. Environmental Adaptation Number of elements that affect the Plan of Care: 3-5:  MODERATE COMPLEXITY CLINICAL DECISION MAKING:  
[facility] AM-PAC 6 Clicks Daily Activity Inpatient Short Form How much help from another person does the patient currently need. .. Total A Lot A Little None 1. Putting on and taking off regular lower body clothing? [] 1   [x] 2   [] 3   [] 4  
2. Bathing (including washing, rinsing, drying)? [] 1   [] 2   [x] 3   [] 4  
3. Toileting, which includes using toilet, bedpan or urinal?   [] 1   [x] 2   [] 3   [] 4  
4. Putting on and taking off regular upper body clothing? [] 1   [] 2   [x] 3   [] 4  
5. Taking care of personal grooming such as brushing teeth? [] 1   [] 2   [] 3   [x] 4  
6. Eating meals? [] 1   [] 2   [] 3   [x] 4  
© 2007, Trustees of [facility], under license to [employer]. All rights reserved Score:  Initial: 18 Most Recent: X (Date: -- ) Interpretation of Tool:  Represents activities that are increasingly more difficult (i.e. Bed mobility, Transfers, Gait). Score 24 23 22-20 19-15 14-10 9-7 6 Modifier CH CI CJ CK CL CM CN   
 
? Self Care:  
  - CURRENT STATUS: CK - 40%-59% impaired, limited or restricted  - GOAL STATUS: CJ - 20%-39% impaired, limited or restricted  - D/C STATUS:  ---------------To be determined--------------- [place] / Product Type: Medicare /   
 
Medical Necessity:    
· Patient is expected to demonstrate progress in range of motion, balance and functional technique to increase independence with self care. Reason for Services/Other Comments: 
· Patient would benefit from OT to maximize safety and independence with self care and functional mobility. Use of outcome tool(s) and clinical judgement create a POC that gives a: LOW COMPLEXITY  
  
 
 
 
TREATMENT:  
(In addition to Assessment/Re-Assessment sessions the following treatments were rendered) Pre-treatment Symptoms/Complaints:  No complaint of pain 
Pain: Initial:  
Pain Intensity 1: 0 0 Post Session:  0 Self Care: (40min): Procedure(s) (per grid) utilized to improve and/or restore self-care/home management as related to dressing, bathing, toileting and grooming. Required minimal verbal and   cueing to facilitate activities of daily living skills and compensatory activities. Treatment/Session Assessment:   
 Response to Treatment:  Pt up to shower tolerated well except loss of balance. Education: 
[] Home Exercises [x] Fall Precautions [] Hip Precautions [] Going Home Video [x] Knee/Hip Prosthesis Review [x] Walker Management/Safety [x] Adaptive Equipment as Needed Interdisciplinary Collaboration:  
o Physical Therapist 
o Occupational Therapist 
o Registered Nurse After treatment position/precautions:  
o Up in chair 
o Bed alarm/tab alert on 
o Bed/Chair-wheels locked 
o Call light within reach 
o RN notified Compliance with Program/Exercises: compliant all of the time. Recommendations/Intent for next treatment session:  Treatment next visit will focus on increasing Ms. Brown's independence with bed mobility, transfers, self care, functional mobility, modalities for pain, and patient education. Total Treatment Duration: OT Patient Time In/Time Out Time In: 1752 Time Out: 1135 Raffy , OT

## 2018-10-10 VITALS
WEIGHT: 124.25 LBS | HEART RATE: 69 BPM | BODY MASS INDEX: 19.97 KG/M2 | DIASTOLIC BLOOD PRESSURE: 66 MMHG | SYSTOLIC BLOOD PRESSURE: 111 MMHG | TEMPERATURE: 97.7 F | RESPIRATION RATE: 16 BRPM | HEIGHT: 66 IN | OXYGEN SATURATION: 97 %

## 2018-10-10 LAB — HGB BLD-MCNC: 7.5 G/DL (ref 11.7–15.4)

## 2018-10-10 PROCEDURE — 74011250637 HC RX REV CODE- 250/637: Performed by: ORTHOPAEDIC SURGERY

## 2018-10-10 PROCEDURE — 97116 GAIT TRAINING THERAPY: CPT

## 2018-10-10 PROCEDURE — 90686 IIV4 VACC NO PRSV 0.5 ML IM: CPT | Performed by: ORTHOPAEDIC SURGERY

## 2018-10-10 PROCEDURE — 90471 IMMUNIZATION ADMIN: CPT

## 2018-10-10 PROCEDURE — 36415 COLL VENOUS BLD VENIPUNCTURE: CPT

## 2018-10-10 PROCEDURE — 74011250636 HC RX REV CODE- 250/636: Performed by: ORTHOPAEDIC SURGERY

## 2018-10-10 PROCEDURE — 97150 GROUP THERAPEUTIC PROCEDURES: CPT

## 2018-10-10 PROCEDURE — 85018 HEMOGLOBIN: CPT

## 2018-10-10 RX ADMIN — SERTRALINE 50 MG: 50 TABLET, FILM COATED ORAL at 08:38

## 2018-10-10 RX ADMIN — ACETAMINOPHEN 1000 MG: 500 TABLET, FILM COATED ORAL at 07:39

## 2018-10-10 RX ADMIN — ACETAMINOPHEN 1000 MG: 500 TABLET, FILM COATED ORAL at 00:09

## 2018-10-10 RX ADMIN — LEVOTHYROXINE SODIUM 100 MCG: 100 TABLET ORAL at 07:39

## 2018-10-10 RX ADMIN — SENNOSIDES AND DOCUSATE SODIUM 2 TABLET: 8.6; 5 TABLET ORAL at 08:39

## 2018-10-10 RX ADMIN — INFLUENZA VIRUS VACCINE 0.5 ML: 15; 15; 15; 15 SUSPENSION INTRAMUSCULAR at 10:26

## 2018-10-10 RX ADMIN — ASPIRIN 81 MG: 81 TABLET, COATED ORAL at 08:39

## 2018-10-10 RX ADMIN — Medication 1 AMPULE: at 08:40

## 2018-10-10 NOTE — PROGRESS NOTES
Denies pain except when she gets up to go to bathroom. Says it hurts a little then. Dressing dry and intact to lt. Hip. neuro vas. cks good to both feet. Moves slowly.

## 2018-10-10 NOTE — PROGRESS NOTES
October 10, 2018 Post Op day: 2 Days Post-Op Admit Date: 10/8/2018 Admit Diagnosis: Unilateral primary osteoarthritis, left hip [M16.12] Subjective: Patient stable. No acute events. Objective:  
Visit Vitals  /67 (BP 1 Location: Right arm, BP Patient Position: At rest)  Pulse 72  Temp 97.6 °F (36.4 °C)  Resp 16  
 Ht 5' 6\" (1.676 m)  Wt 56.4 kg (124 lb 4 oz)  SpO2 96%  BMI 20.05 kg/m2 Temp (24hrs), Av.8 °F (36.6 °C), Min:97.6 °F (36.4 °C), Max:98 °F (36.7 °C) Lab Results Component Value Date/Time HGB 7.5 (L) 10/10/2018 04:27 AM  
 
Extremity Exam 
Dressing clean and dry Tibialis Anterior and Gastroc-Soleus functioning normally left lower extremity Sensation intact to light touch on operative limb Extremity perfused TEDS/SCDS in place No sign of DVT Assessment / Plan : 
WBAT LLE Posterior hip precautions Continue PT/OT Continue current DVT prophylaxis in house. Discharge on ASA BID Danielle Sas Patient Active Problem List  
Diagnosis Code  Anxiety and depression F41.9, F32.9  Hyponatremia E87.1  Hypokalemia E87.6  Vitamin B 12 deficiency E53.8  Hyperlipidemia E78.5  Other vitamin B12 deficiency anemia D51.8  Decreased ambulation status Z74.09  
 Chronic low back pain with sciatica M54.40, G89.29  
 Poor tolerance for ambulation Z78.9  Arthralgia M25.50  Bilateral edema of lower extremity R60.0  Precordial pain R07.2  Localized edema R60.0  Diabetes mellitus without complication ([facility] [address] T30.4  Type 2 diabetes with nephropathy (HCC) E11.21  
 OA (osteoarthritis) of hip M16.9 Signed By: Daniel Page MD  
 
I have reviewed the patients controlled substance prescription history, as maintained in the [place] prescription monitoring program, so that the prescription(s) for a  controlled substance can be given.

## 2018-10-10 NOTE — PROGRESS NOTES
Problem: Falls - Risk of 
Goal: *Absence of Falls Document Bozena Cornea Fall Risk and appropriate interventions in the flowsheet. Outcome: Progressing Towards Goal 
Fall Risk Interventions: 
Mobility Interventions: Patient to call before getting OOB Medication Interventions: Patient to call before getting OOB Elimination Interventions: Patient to call for help with toileting needs

## 2018-10-10 NOTE — PROGRESS NOTES
Problem: Mobility Impaired (Adult and Pediatric) Goal: *Acute Goals and Plan of Care (Insert Text) GOALS (1-4 days): 
(1.)Ms. Rachana Mae will move from supine to sit and sit to supine  in bed with STAND BY ASSIST.   
(2.)Ms. Rachana Mae will transfer from bed to chair and chair to bed with STAND BY ASSIST using the least restrictive device. (3.)Ms. Rachana Mae will ambulate with STAND BY ASSIST for 150 feet with the least restrictive device. Met 10/10/18 (4.)Ms. Rachana Mae will ambulate up/down 3 steps with bilateral  railing with MINIMAL ASSIST with no device. 2 steps with R rail with SBA 10/10/18 (5.)Ms. Rachana Mae will state/observe KASSIE precautions with 0 verbal cues. ________________________________________________________________________________________________ PHYSICAL THERAPY [facility] [place]: Daily Note, AM 10/10/2018 INPATIENT: Hospital Day: 3 [place] / Product Type: Medicare /  
  
NAME/AGE/GENDER: Sam Melara is a 80 y.o. female PRIMARY DIAGNOSIS:  Unilateral primary osteoarthritis, left hip [M16.12] Procedure(s) and Anesthesia Type: 
   * LEFT HIP ARTHROPLASTY TOTAL  - Spinal (Left) ICD-10: Treatment Diagnosis:  
 · Pain in left hip (M25.552) · Stiffness of Left Hip, Not elsewhere classified (M25.652) · Difficulty in walking, Not elsewhere classified (R26.2) ASSESSMENT:  
 
Ms. Rachana Mae participated well. Very anxious to go home today. Ambulating well with some internal rotation of L LE but patient aware and trying to correct. Needing assist with exercises but secondary to cognitive issues, not physical.  Able to negotiate steps well. Patient ready to d/c home with assist from her family and HHPT. This section established at most recent assessment PROBLEM LIST (Impairments causing functional limitations): 1. Decreased Strength 2. Decreased ADL/Functional Activities 3. Decreased Transfer Abilities 4. Decreased Ambulation Ability/Technique 5. Increased Pain 6. Decreased Flexibility/Joint Mobility 7. Decreased Knowledge of Precautions 8. Decreased [place] with Home Exercise Program 
 INTERVENTIONS PLANNED: (Benefits and precautions of physical therapy have been discussed with the patient.) 1. Bed Mobility 2. Cold 3. Gait Training 4. Home Exercise Program (HEP) 5. Therapeutic Exercise/Strengthening 6. Transfer Training 7. Range of Motion: active/assisted/passive 8. Therapeutic Activities 9. Group Therapy TREATMENT PLAN: Frequency/Duration: D/C home with HEP and HHPT. Rehabilitation Potential For Stated Goals: Good RECOMMENDED REHABILITATION/EQUIPMENT: (at time of discharge pending progress): Continue Skilled Therapy and Home Health: Physical Therapy. HISTORY:  
History of Present Injury/Illness (Reason for Referral): 
Pt s/p left GILBERTO on 10/9/18 Past Medical History/Comorbidities:  
Ms. Salvador Lopes  has a past medical history of Anxiety; Decreased mobility; Edema; GERD (gastroesophageal reflux disease); [place] (hard of hearing); echocardiogram (01/04/2017); Hyperlipidemia; Hypertension; Hypothyroid; Osteoarthritis; and Pneumonia (2003). Ms. Salvador Lopes  has a past surgical history that includes hx other surgical (2013) and hx hip replacement (Right, 2003). Social History/Living Environment:  
Home Environment: Private residence # Steps to Enter: 3 Rails to Enter: Yes One/Two Story Residence: One story Living Alone: No 
Support Systems: Family member(s) Patient Expects to be Discharged to[de-identified] Private residence Current DME Used/Available at Home: [employer], straight, [employer], bedside, Walker, [employer] Tub or Shower Type: Tub/Shower combination Prior Level of Function/Work/Activity: 
Pt needing some assist for her ADLS and for mobility. Plans to go home with daughter and son in law support Number of Personal Factors/Comorbidities that affect the Plan of Care: 0: LOW COMPLEXITY EXAMINATION:  
 Most Recent Physical Functioning:  
  
Gross Assessment AROM: Generally decreased, functional 
Strength: Generally decreased, functional 
Coordination: Generally decreased, functional 
 
         
 
LLE Strength L Hip Flexion: 2 L Hip ABduction: 2 L Knee Flexion: 3 L Knee Extension: 3 Transfers Sit to Stand: Stand-by assistance Stand to Sit: Stand-by assistance Balance Sitting: Intact Standing: Pull to stand; With support    Posture Posture Assessment: Scoliosis left;Scoliosis right Weight Bearing Status Left Side Weight Bearing: As tolerated Distance (ft): 164 Feet (ft) Ambulation - Level of Assistance: Stand-by assistance Assistive Device: Walker, rolling Speed/Anne: Pace decreased (<100 feet/min) Step Length: Left shortened;Right shortened Number of Stairs Trained: 2 Stairs - Level of Assistance: Stand-by assistance Rail Use: Right Interventions: Safety awareness training;Verbal cues Braces/Orthotics: none Left Hip Cold Type: Cold/ice pack Body Structures Involved: 1. Joints 2. Muscles Body Functions Affected: 1. Movement Related Activities and Participation Affected: 1. Mobility 2. Self Care Number of elements that affect the Plan of Care: 4+: HIGH COMPLEXITY CLINICAL PRESENTATION:  
Presentation: Stable and uncomplicated: LOW COMPLEXITY CLINICAL DECISION [facility] [id number] AM-PAC 6 Clicks Basic Mobility Inpatient Short Form How much difficulty does the patient currently have. .. Unable A Lot A Little None 1. Turning over in bed (including adjusting bedclothes, sheets and blankets)? [] 1   [] 2   [x] 3   [] 4  
2. Sitting down on and standing up from a chair with arms ( e.g., wheelchair, bedside commode, etc.)   [] 1   [] 2   [x] 3   [] 4  
3. Moving from lying on back to sitting on the side of the bed? [] 1   [] 2   [x] 3   [] 4 How much help from another person does the patient currently need. ..  Total A Lot A Little None 4. Moving to and from a bed to a chair (including a wheelchair)? [] 1   [] 2   [x] 3   [] 4  
5. Need to walk in hospital room? [] 1   [] 2   [x] 3   [] 4  
6. Climbing 3-5 steps with a railing? [] 1   [] 2   [x] 3   [] 4  
© 2007, Trustees of [facility] [employer], under license to [employer]. All rights reserved Score:  Initial: 17 Most Recent: 18 (Date: 10/10/18 ) Interpretation of Tool:  Represents activities that are increasingly more difficult (i.e. Bed mobility, Transfers, Gait). Score 24 23 22-20 19-15 14-10 9-7 6 Modifier CH CI CJ CK CL CM CN   
 
? Mobility - Walking and Moving Around:  
  - CURRENT STATUS: CK - 40%-59% impaired, limited or restricted  - GOAL STATUS: CI - 1%-19% impaired, limited or restricted  - D/C STATUS:  CK - 40%-59% impaired, limited or restricted [place] / [place] / Product Type: Medicare /   
 
Medical Necessity:    
· Patient is expected to demonstrate progress in strength, range of motion, coordination and functional technique to decrease assistance required with functional mobility and GILBERTO exercises. Reason for Services/Other Comments: 
· Patient continues to require skilled intervention due to inability to complete functional mobility and GILBERTO Exercises independently. Use of outcome tool(s) and clinical judgement create a POC that gives a: Clear prediction of patient's progress: LOW COMPLEXITY  
  
 
 
 
TREATMENT:  
(In addition to Assessment/Re-Assessment sessions the following treatments were rendered) Pre-treatment Symptoms/Complaints:  Patient reports she went walking earlier. Pain Initial:  
Pain Intensity 1: 0  Post Session:  0 Therapeutic Exercise: (45 Minutes (group)):  Exercises per grid below to improve mobility and strength. Required minimal verbal and manual cues to promote proper body alignment and promote proper body posture.   Progressed repetitions as indicated. Gait Training (15 Minutes):  Gait training to improve and/or restore physical functioning as related to mobility and strength. Ambulated 164 Feet (ft) with Stand-by assistance using a Walker, rolling and minimal Safety awareness training;Verbal cues related to their stance phase and stride length to promote proper body alignment and promote proper body posture. Instruction in performance of walker use and safety awareness to correct stance phase and stride length. Date: 
10/9/18 Date: 
10/10/18 Date: 
  
ACTIVITY/EXERCISE AM PM AM PM AM PM  
GROUP THERAPY  []  [x]  [x]  []  []  [] Ankle Pumps 10 15 20 Quad Sets 10 15 20 Gluteal Sets 10 15 20 Hip ABd/ADduction 10 15 20 AA Straight Leg Raises Knee Slides 10 15 20 Short Arc Quads  15 20 [address] 10 15 20 Chair Slides B = bilateral; AA = active assistive; A = active; P = passive Treatment/Session Assessment:   
 Response to Treatment:  Patient participated well. Ambulating well and able to negotiate steps. Education: 
[x] Home Exercises [x] Fall Precautions [x] Hip Precautions [x] D/C Instruction Review [x] Hip Prosthesis Review [x] Walker Management/Safety [] Adaptive Equipment as Needed Interdisciplinary Collaboration:  
o Physical Therapist 
o Physical Therapy Assistant 
o Registered Nurse 
o Rehabilitation Attendant After treatment position/precautions:  
o Up in chair 
o Bed/Chair-wheels locked 
o Call light within reach 
o Family at bedside Compliance with Program/Exercises: compliant all of the time. Recommendations/Intent for next treatment session:  D/C home with HEP and HHPT. Total Treatment Duration: PT Patient Time In/Time Out Time In: 1030 Time Out: 1130 Constance Mcclure, PT

## 2018-10-10 NOTE — OP NOTES
Total Hip Procedure Note    Piter Dooley,  [id number],  11/11/1932    Pre-operative Diagnosis:  Unilateral primary osteoarthritis, left hip [M16.12]    Post-operative Diagnosis: Unilateral primary osteoarthritis, left hip [M16.12]    Procedure: Left Total Hip Arthroplasty    BMI: Body mass index is 20.05 kg/(m^2). [place] Location: [address] [place] [address]    Surgeon: Olya Shell MD    Assistant: Stacy Garvin CFA    Anesthesia: Spinal     Complications: None    EBL: 200cc    Drains: None      Intra-op Findings: Pre-operatively leg lengths were assessed using preop Xrays and with the patient in the lateral decubitus position and operative leg was felt to be similar in length compared to the contralateral leg. The operative hip showed notable cartilage loss of both the femoral head and acetabulum. No intra-operative periprosthetic fracture was encountered. Sciatic nerve was noted to be intact at the end of the procedure. We measured the distance of our resected femoral head/neck from the cut surface to the center of the femoral head to be approximately 35mm. The overall length replaced with the implanted head/neck was 35mm. Intra-operatively we felt that we restored the patients leg lengths to equal lengths using the method described later. Postop with the patient supine we assessed leg lengths and felt they were similar. Patient condition at completion of Procedure: Stable    Implants:   Implant Name Type Inv.  Item Serial No.  Lot No. LRB No. Used   low profile hex screw   [id number] [employer] 7LU Left 1   acetabular shell   [id number] [employer] [id number] Left 1   HEAD FEM DELT V40 +0MM NK 36MM -- V40 BIOLOX - [id number]  HEAD FEM DELT V40 +0MM NK 36MM -- V40 BIOLOX [id number] [employer] HOW [id number] Left 1   INSERT 0DEG TRIDN X3 POLY 36 D --  - [id number]  INSERT 0DEG TRIDN X3 POLY 36 D --  [id number] [employer] HOW [id number] Left 1   STEM FEM SZ 4 [id number] -- ACCOLADE II V40 - [id number]   STEM FEM SZ 4 127D [id number] -- Mariama Mi [id number] [employer] HOW [id number] Left 1       Description of Procedure    The complexity of the total joint surgery requires the use of a first assistant for positioning, retraction and assistance in closure. Shanda Mahoney was brought to the operating room. Patient was transferred from the stretcher to OR bed. Spinal anesthetic was induced. Ndiaye catheter was placed. IV antibiotics were administered per protocol. Shanda Mahoney was positioned in the lateral decubitus position and the pelvis/torso stabilized with posts. The limb was prepped and draped in the usual sterile fashion. A time out identifying the patient, procedure, operative side and surgeon was administered and charted by the OR Nurse. Prior to incision one gram of TXA was given intravenously. A standard posterior approach was utilized to expose the hip. The incision was carried through the subcutaneous tissue and underlying fascia with hemostasis obtained using the bovie cauterization and Aquamantys. A Charmley retractor was inserted. We resected any redundent bursal tissue off the external rotators. We were able to identify the piriformis tendon. The short external rotators and capsule were dissected off the posterior femur as a single layer just superior to the piriformis tendon. The sciatic nerve was palpated and protected during dissection. The femoral head was dislocated. The articular surface revealed loss of cartilage, exposed bone and bone spurring. The neck was osteotomized approximately 1cm above the top of the lesser trochanter. We were careful to protect the greater trochanter during osteotomy and protect it from iatrogenic injury. Acetabular retractors were placed both anterior and posterior just superficial to the acetabular labrum.   Remaining acetabular labrum was resected and any soft tissue was removed from the acetabulum including any tissue within the codyloid fossa. The acetabular surface revealed loss of cartilage with exposed bone. The acetabulum was sequentially reamed noting proper anteversion and inclination during reaming. The transverse acetabular ligament was used as the primary anatomic landmark to determine version and inclination. The acetabulum was sized to a 52 mm acetabular component. The prepared acetabulum was irrigated of any residual reamings and soft tissue. The permanent acetabular component was impacted into place to achieve appropriate horizontal tilt, anteversion, bone coverage and stability. We visualize that the acetabular component was fully seated. A trial liner was impacted into position. We did not have to excise overhanging osteophytes anterior and posterior  in order to minimize the risk of impingement. We then turned our attention to the proximal femur. A 2-prong proximal femoral retractor was placed. We gained access to the proximal femur using a  and femoral canal finder. The canal was prepared with appropriate lateralization in which we used the initial smaller broaches to remove lateral bone to avoid varus placement of the femoral component. The canal was then broached progressively. The broach was positioned with the appropriate anatomic femoral anteversion. We broached up to a size 4 Accolade II stem. A calcar planar was used. A trial reduction with a size #4 132 degree Accolade II stem with a +0 neck length and 36 mm head was performed. This was found to be the most stable to flexion greater than 90 degrees and on internal and external rotation. Limb lengths were assessed using three techniques. First, the position of the tip of the operative greater trochanter was compared to the center of the femoral head component and was felt to match this same anatomic relationship as the non-operative hip based on preoperative X-rays.  Next, we measured the length of our resected femoral head neck and felt that the trial components matched this resected length as closely as possible when accounting for the length provided by the femoral neck/head. Finally, we compared leg lengths by comparing the possible of the patella with the patients heels together with the patient in the lateral decubitus position. Using these methods it was felt that the patients leg lengths were equilibrated and with appropriate stability as mentioned above. All trial components were removed. Prior to final polyethylene insertion one screw was placed to further stabilize the cup. The final liner was impacted into place which was a neutral liner. A cementless size 4 132 degree Accolade II stem was impacted into place carefully. We were careful to observe the calcar region for any iatrogenic fractures during insertion. The permanent femoral head was impacted into place which was a +0mm 36mm ceramic head. Sanjeev Brown's hip was reduced and stability was as mentioned above. Dilute Betadine solution was allowed to sit in the surgical site for a 3 minute period and copious saline was used to irrigate the surgical site. The sciatic nerve was palpated and noted to be intact. A periarticular of ropivicaine, toradol, and morphine was injected about the surgical site with care being taken not to inject in the vicinity of neurovascular structures. Prior to wound closure one additional gram of TXA was given for a total of 2 grams. The capsule was repaired with a three #2 Fiberwires secured through drill holes placed in the posterior aspect of the trochanter. No drain was placed. The fascia was closed with a  #0 Bidirectional Stratafix barbed suture. The sub-Q layer was closed with 2-0 Vicryl suture and a  3-0 moncril stratafix suture in a running subcuticular fashion was used to close the skin.  The incision site was thoroughly cleaned with alcohol and the Exofin wound closure system was applied to seal it off from external contamination after the overlying skin was thoroughly cleaned with alcohol. A thin layer of [place] lubricant was applied over the wound to keep the dressing from adhering to the overlying sterile bandage and said bandage was placed. Drapes were then broken down and patient was transferred carefully back to the OR stretcher. The sponge and needle counts were correct. The patient tolerated the procedure without difficulty and left the operating room in satisfactory condition.     Signed By: Jackie Gerber MD

## 2018-10-10 NOTE — DISCHARGE INSTRUCTIONS
801    Patient Discharge Instructions    Chen Schmidt / [id number] : 1932    Admitted 10/8/2018 Discharged: 10/10/2018     IF YOU HAVE ANY PROBLEMS ONCE YOU ARE AT HOME CALL THE FOLLOWING NUMBERS:   Main office number: [phone]    Take Home Medications     · It is important that you take the medication exactly as they are prescribed. · Keep your medication in the bottles provided by the pharmacist and keep a list of the medication names, dosages, and times to be taken in your wallet. · Do not take other medications without consulting your doctor. What to do at [address] your prehospital diet. If you have excessive nausea or vomitting call your doctor's office     Home Physical Therapy is arranged. Use rolling walker when walking. Patients who have had a joint replacement should not drive until you are seen for your follow up appointment by Dr. Chaim Locke. When to Call    - Call if you have a temperature greater then 101  - Unable to keep food down  - Loose control of your bladder or bowel function  - Are unable to bear any weight   - Need a pain medication refill       DISCHARGE SUMMARY from Nurse    The following personal items collected during your admission are returned to you:   Dental Appliance: Dental Appliances: None  Vision: Visual Aid: Glasses  Hearing Aid:    Jewelry: Jewelry: None  Clothing: Clothing: Other (comment)  Other Valuables: Other Valuables: Wallet (PT'S GRAND DAUGHTER HAS VALUABLES)  Valuables sent to safe:      PATIENT INSTRUCTIONS:    After general anesthesia or intravenous sedation, for 24 hours or while taking prescription Narcotics:  · Limit your activities  · Do not drive and operate hazardous machinery  · Do not make important personal or business decisions  · Do  not drink alcoholic beverages  · If you have not urinated within 8 hours after discharge, please contact your surgeon on call.     Report the following to your surgeon:  · Excessive pain, swelling, redness or odor of or around the surgical area  · Temperature over 101  · Nausea and vomiting lasting longer than 4 hours or if unable to take medications  · Any signs of decreased circulation or nerve impairment to extremity: change in color, persistent  numbness, tingling, coldness or increase pain  · Follow hip precautions @ all times! · Any questions, call office @ [phone]      Keep scheduled follow up appointment. If need to change, call office @ [phone]. *  Please give a list of your current medications to your Primary Care Provider. *  Please update this list whenever your medications are discontinued, doses are      changed, or new medications (including over-the-counter products) are added. *  Please carry medication information at all times in case of emergency situations. Hip Replacement Surgery (Posterior): What to Expect at Home  Your Recovery  Hip replacement surgery replaces the worn parts of your hip joint. When you leave the hospital, you will probably be walking with crutches or a walker. You may be able to climb a few stairs and get in and out of bed and chairs. But you will need someone to help you at home for the next few weeks or until you have more energy and can move around better. If there is no one to help you at home, you may go to a rehabilitation center or long-term care center. You will go home with a bandage and stitches, staples, tissue glue, or tape strips. You can remove the bandage when your doctor tells you to. If you have stitches or staples, your doctor will remove them 10 days to 3 weeks after your surgery. Glue or tape strips will fall off on their own over time. You may still have some mild pain, and the area may be swollen for 3 to 4 months after surgery. Your doctor will give you medicine for the pain.   You will continue the rehabilitation program (rehab) you started in the hospital. The better you do with your rehab exercises, the sooner you will get your strength and movement back. Most people are able to return to work 4 weeks to 4 months after surgery. This care sheet gives you a general idea about how long it will take for you to recover. But each person recovers at a different pace. Follow the steps below to get better as quickly as possible. How can you care for yourself at home? Activity    · Your doctor may not want your affected leg to cross the center of your body toward the other leg. If so, your therapist may suggest these ideas:  ¨ Do not cross your legs. ¨ Be very careful as you get in or out of bed or a car, so your leg does not cross that imaginary line in the middle of your body.     · Rest when you feel tired. You may take a nap, but do not stay in bed all day.     · Work with your physical therapist to learn the best way to exercise. You may be able to take frequent, short walks using crutches or a walker. You will probably have to use crutches or a walker for at least 4 to 6 weeks.     · Your doctor may advise you to stay away from activities that put stress on the joint. This includes sports such as tennis, football, and jogging.     · Try not to sit for too long at one time. You will feel less stiff if you take a short walk about every hour. When you sit, use chairs with arms, and do not sit in low chairs.     · Do not bend over more than 90 degrees (like the angle in a letter \"L\").     · Sleep on your back with your legs slightly apart or on your side with a pillow between your knees for about 6 weeks or as your doctor tells you. Do not sleep on your stomach or affected leg.     · Ask your doctor when you can drive again.     · Most people are able to return to work 4 weeks to 4 months after surgery.     · Ask your doctor when it is okay for you to have sex. Diet    · By the time you leave the hospital, you will probably be eating your normal diet.  If your stomach is upset, try bland, low-fat foods like plain rice, broiled chicken, toast, and yogurt. Your doctor may recommend that you take iron and vitamin supplements.     · Drink plenty of fluids (unless your doctor tells you not to).   · Eat healthy foods, and watch your portion sizes. Try to stay at your ideal weight. Too much weight puts more stress on your new hip joint.     · You may notice that your bowel movements are not regular right after your surgery. This is common. Try to avoid constipation and straining with bowel movements. You may want to take a fiber supplement every day. If you have not had a bowel movement after a couple of days, ask your doctor about taking a mild laxative. Medicines    · Your doctor will tell you if and when you can restart your medicines. He or she will also give you instructions about taking any new medicines.     · If you take blood thinners, such as warfarin (Coumadin), clopidogrel (Plavix), or aspirin, be sure to talk to your doctor. He or she will tell you if and when to start taking those medicines again. Make sure that you understand exactly what your doctor wants you to do.     · Your doctor may give you a blood-thinning medicine to prevent blood clots. If you take a blood thinner, be sure you get instructions about how to take your medicine safely. Blood thinners can cause serious bleeding problems. This medicine could be in pill form or as a shot (injection). If a shot is necessary, your doctor will tell you how to do this.     · Be safe with medicines. Take pain medicines exactly as directed. ¨ If the doctor gave you a prescription medicine for pain, take it as prescribed. ¨ If you are not taking a prescription pain medicine, ask your doctor if you can take an over-the-counter medicine.     · If you think your pain medicine is making you sick to your stomach:  ¨ Take your medicine after meals (unless your doctor has told you not to).   ¨ Ask your doctor for a different pain medicine.     · If your doctor prescribed antibiotics, take them as directed. Do not stop taking them just because you feel better. You need to take the full course of antibiotics. Incision care    · If your doctor told you how to care for your cut (incision), follow your doctor's instructions. You will have a dressing over the cut. A dressing helps the incision heal and protects it. Your doctor will tell you how to take care of this.     · If you did not get instructions, follow this general advice:  ¨ If you have strips of tape on the cut the doctor made, leave the tape on for a week or until it falls off. ¨ If you have stitches or staples, your doctor will tell you when to come back to have them removed. ¨ If you have skin adhesive on the cut, leave it on until it falls off. Skin adhesive is also called glue or liquid stitches. ¨ Change the bandage every day. ¨ Wash the area daily with warm water, and pat it dry. Don't use hydrogen peroxide or alcohol. They can slow healing. ¨ You may cover the area with a gauze bandage if it oozes fluid or rubs against clothing. ¨ You may shower 24 to 48 hours after surgery. Pat the incision dry. Don't swim or take a bath for the first 2 weeks, or until your doctor tells you it is okay. Exercise    · Your rehab program will include a number of exercises to do. Always do them as your therapist tells you. Ice and elevation    · For pain, put ice or a cold pack on the area for 10 to 20 minutes at a time. Put a thin cloth between the ice and your skin.     · Your ankle may swell for about 3 months. Prop up your ankle when you ice it or anytime you sit or lie down. Try to keep it above the level of your heart. This will help reduce swelling. Other instructions   Continue to wear your support stockings as your doctor says. These help to prevent blood clots. The length of time that you will have to wear them depends on your activity level and the amount of swelling you have.  Most people wear these stockings for 4 to 6 weeks after surgery.   Preventing falls is also very important. To prevent falls:    · Arrange furniture so that you will not trip on it.     · Get rid of throw rugs, and move electrical cords out of the way.     · Walk only in areas with plenty of light.     · Put grab bars in showers and bathtubs.     · Avoid icy or snowy sidewalks.     · Wear shoes with sturdy, flat soles. Follow-up care is a key part of your treatment and safety. Be sure to make and go to all appointments, and call your doctor if you are having problems. It's also a good idea to know your test results and keep a list of the medicines you take. When should you call for help? Call 911 anytime you think you may need emergency care. For example, call if:    · You passed out (lost consciousness).     · You have severe trouble breathing.     · You have sudden chest pain and shortness of breath, or you cough up blood.    Call your doctor now or seek immediate medical care if:    · You have signs that your hip may be dislocated, including:  ¨ Severe pain and not being able to stand. ¨ A crooked leg that looks like your hip is out of position. ¨ Not being able to bend or straighten your leg.     · Your leg or foot is cool or pale or changes color.     · You cannot feel or move your leg.     · You have signs of a blood clot, such as:  ¨ Pain in your calf, back of the knee, thigh, or groin. ¨ Redness and swelling in your leg or groin.     · Your incision comes open and begins to bleed, or the bleeding increases.     · You feel like your heart is racing or beating irregularly.     · You have signs of infection, such as:  ¨ Increased pain, swelling, warmth, or redness. ¨ Red streaks leading from the incision. ¨ Pus draining from the incision.   ¨ A fever.    Watch closely for changes in your health, and be sure to contact your doctor if:    · You do not have a bowel movement after taking a laxative.     · You do not get better as expected. Where can you learn more? Go to http://jonatan-carlo.info/. Enter Z265 in the search box to learn more about \"Hip Replacement Surgery (Posterior): What to Expect at Home. \"  Current as of: November 29, 2017  Content Version: 11.8  © 6826-4513 [employer]. Care instructions adapted under license by [employer] (which disclaims liability or warranty for this information). If you have questions about a medical condition or this instruction, always ask your healthcare professional. [address] any warranty or liability for your use of this information. These are general instructions for a healthy lifestyle:    No smoking/ No tobacco products/ Avoid exposure to second hand smoke    Surgeon General's Warning:  Quitting smoking now greatly reduces serious risk to your health. Obesity, smoking, and sedentary lifestyle greatly increases your risk for illness    A healthy diet, regular physical exercise & weight monitoring are important for maintaining a healthy lifestyle    You may be retaining fluid if you have a history of heart failure or if you experience any of the following symptoms:  Weight gain of 3 pounds or more overnight or 5 pounds in a week, increased swelling in our hands or feet or shortness of breath while lying flat in bed. Please call your doctor as soon as you notice any of these symptoms; do not wait until your next office visit. Recognize signs and symptoms of STROKE:    F-face looks uneven    A-arms unable to move or move even    S-speech slurred or non-existent    T-time-call 911 as soon as signs and symptoms begin-DO NOT go       Back to bed or wait to see if you get better-TIME IS BRAIN. The discharge information has been reviewed with the patient. The patient verbalized understanding.       Information obtained by :  I understand that if any problems occur once I am at home I am to contact my physician. I understand and acknowledge receipt of the instructions indicated above.                                                                                                                                            Physician's or R.N.'s Signature                                                                  Date/Time                                                                                                                                              Patient or Representative Signature                                                          Date/Time

## 2018-10-11 ENCOUNTER — PATIENT OUTREACH (OUTPATIENT)
Dept: CASE MANAGEMENT | Age: 83
End: 2018-10-11

## 2018-10-11 ENCOUNTER — HOME CARE VISIT (OUTPATIENT)
Dept: SCHEDULING | Facility: HOME HEALTH | Age: 83
End: 2018-10-11
Payer: MEDICARE

## 2018-10-11 VITALS
SYSTOLIC BLOOD PRESSURE: 132 MMHG | RESPIRATION RATE: 17 BRPM | HEART RATE: 70 BPM | DIASTOLIC BLOOD PRESSURE: 78 MMHG | TEMPERATURE: 98.5 F

## 2018-10-11 PROCEDURE — 3331090001 HH PPS REVENUE CREDIT

## 2018-10-11 PROCEDURE — 400013 HH SOC

## 2018-10-11 PROCEDURE — 3331090002 HH PPS REVENUE DEBIT

## 2018-10-11 PROCEDURE — G0151 HHCP-SERV OF PT,EA 15 MIN: HCPCS

## 2018-10-11 NOTE — PROGRESS NOTES
Transition of Care Discharge Follow-up Questionnaire Date/Time of MARIAM Outreach: 10/10/18 3:41PM  
What was the patient hospitalized for? Patient was hospitalized for Left GILBERTO Does the patient understand his/her diagnosis and/or treatment and what happened during the hospitalization? CM Assessed Risk for Readmission: 
 
 
 
Patient stated Risk for Readmission: 
 
 Spoke to Granddaughter who consented to KAM [facility]AYMOND outreach, and verbalized understanding of treatment and diagnosis. Risk for Readmission completed and assessed and Care Coordinator concludes patient could possibly readmit due to diagnosis. Granddaughter denies readmission is likely. Has supportive family. Did the patient receive discharge instructions? Granddaughter states d/c instructions received. Review any discharge instructions (see notes in [facility]). Ask patient if they understand these. Do they have any questions? Granddaughter discussed discharge plan and instruction as Granddaughter understood it to be with Care Coordinator. Which I have documented in the pertinent areas throughout this progress note. Were home services ordered (nursing, PT, OT, ST, etc.)? [facility] ordered at d/c. If so, has the first visit occurred? If not, why? (Assist with coordination of services if necessary.) Today. Was any DME ordered? No DME ordered at d/c. If so, has it been received? If not, why?  (Assist with coordination of arranging DME orders if necessary.) N/A. Complete a review of all medications (new, continued and discontinued meds per the D/C instructions and medication tab in [address] [place] [address]). Review of all meds completed with Granddaughter and Care Coordinator. Tylenol, Aspirin, Norco, Pericolace, Zofran meds prescribed at d/c. Were all new prescriptions filled? If not, why?  (Assist with obtainment of medications if necessary.) Yes.   
Does the patient understand the purpose and dosing instructions for all medications? (If patient has questions, provide explanation and education.) Indicated by [facility], the purpose and dosing instructions for all medications were understood. Does the patient have any problems in performing ADLs? (If patient is unable to perform ADLs  what is the limiting factor(s)? Do they have a support system that can assist? If no support system is present, discuss possible assistance that they may be able to obtain.) Granddaughter states patient is independent with all ADLs. Per Granddaughter patient has strong family support. Does the patient have all follow-up appointments scheduled? 7 day f/up with PCP? 
 
7-14 day f/up with specialist? 
 
If f/up has not been made  what actions has the care coordinator made to accomplish this? Has transportation been arranged? [facility] Pulmonary follow-up should be within 7 days of discharge; all others should have PCP follow-up within 7 days of discharge; follow-ups with other specialists as appropriate or ordered.) Yes. PCP f/u 11/1 at 830 am as scheduled by office. Orthopedic f/u was scheduled at pre-op visit for 2wks from d/c per granddaughter. She is driving and does not have in front her right now. Granddaughter denies need for transportation. Schedule next appointment with KAM RAYMOND Coordinator or refer to RN Case Manager/  per the workflow guidelines. Care Coordinator to f/u 10/25. Any other questions or concerns expressed by the patient? Gratitude stated and no further questions asked or needs identified. MARIAM Call Completed By:  
Jovana Chester LPN/ Care Coordinator DQROLX:[phone] Yovany [address] [place] [address] 
[email or web address] This note will not be viewable in [address]

## 2018-10-12 PROCEDURE — 3331090002 HH PPS REVENUE DEBIT

## 2018-10-12 PROCEDURE — 3331090001 HH PPS REVENUE CREDIT

## 2018-10-13 PROCEDURE — 3331090001 HH PPS REVENUE CREDIT

## 2018-10-13 PROCEDURE — 3331090002 HH PPS REVENUE DEBIT

## 2018-10-14 PROCEDURE — 3331090001 HH PPS REVENUE CREDIT

## 2018-10-14 PROCEDURE — 3331090002 HH PPS REVENUE DEBIT

## 2018-10-15 ENCOUNTER — HOME CARE VISIT (OUTPATIENT)
Dept: SCHEDULING | Facility: HOME HEALTH | Age: 83
End: 2018-10-15
Payer: MEDICARE

## 2018-10-15 VITALS
TEMPERATURE: 99.1 F | RESPIRATION RATE: 18 BRPM | DIASTOLIC BLOOD PRESSURE: 70 MMHG | SYSTOLIC BLOOD PRESSURE: 128 MMHG | HEART RATE: 82 BPM

## 2018-10-15 PROCEDURE — 3331090001 HH PPS REVENUE CREDIT

## 2018-10-15 PROCEDURE — G0151 HHCP-SERV OF PT,EA 15 MIN: HCPCS

## 2018-10-15 PROCEDURE — 3331090002 HH PPS REVENUE DEBIT

## 2018-10-16 PROCEDURE — 3331090001 HH PPS REVENUE CREDIT

## 2018-10-16 PROCEDURE — 3331090002 HH PPS REVENUE DEBIT

## 2018-10-17 PROCEDURE — 3331090002 HH PPS REVENUE DEBIT

## 2018-10-17 PROCEDURE — 3331090001 HH PPS REVENUE CREDIT

## 2018-10-18 ENCOUNTER — HOME CARE VISIT (OUTPATIENT)
Dept: SCHEDULING | Facility: HOME HEALTH | Age: 83
End: 2018-10-18
Payer: MEDICARE

## 2018-10-18 PROCEDURE — 3331090002 HH PPS REVENUE DEBIT

## 2018-10-18 PROCEDURE — 3331090001 HH PPS REVENUE CREDIT

## 2018-10-18 PROCEDURE — G0157 HHC PT ASSISTANT EA 15: HCPCS

## 2018-10-19 ENCOUNTER — HOME CARE VISIT (OUTPATIENT)
Dept: SCHEDULING | Facility: HOME HEALTH | Age: 83
End: 2018-10-19
Payer: MEDICARE

## 2018-10-19 PROCEDURE — 3331090002 HH PPS REVENUE DEBIT

## 2018-10-19 PROCEDURE — G0157 HHC PT ASSISTANT EA 15: HCPCS

## 2018-10-19 PROCEDURE — 3331090001 HH PPS REVENUE CREDIT

## 2018-10-20 PROCEDURE — 3331090002 HH PPS REVENUE DEBIT

## 2018-10-20 PROCEDURE — 3331090001 HH PPS REVENUE CREDIT

## 2018-10-21 PROCEDURE — 3331090002 HH PPS REVENUE DEBIT

## 2018-10-21 PROCEDURE — 3331090001 HH PPS REVENUE CREDIT

## 2018-10-22 ENCOUNTER — HOME CARE VISIT (OUTPATIENT)
Dept: SCHEDULING | Facility: HOME HEALTH | Age: 83
End: 2018-10-22
Payer: MEDICARE

## 2018-10-22 VITALS
RESPIRATION RATE: 20 BRPM | SYSTOLIC BLOOD PRESSURE: 132 MMHG | TEMPERATURE: 97.4 F | DIASTOLIC BLOOD PRESSURE: 78 MMHG | HEART RATE: 82 BPM

## 2018-10-22 PROCEDURE — G0157 HHC PT ASSISTANT EA 15: HCPCS

## 2018-10-22 PROCEDURE — 3331090002 HH PPS REVENUE DEBIT

## 2018-10-22 PROCEDURE — 3331090001 HH PPS REVENUE CREDIT

## 2018-10-23 VITALS
HEART RATE: 82 BPM | DIASTOLIC BLOOD PRESSURE: 78 MMHG | TEMPERATURE: 97.2 F | SYSTOLIC BLOOD PRESSURE: 128 MMHG | RESPIRATION RATE: 20 BRPM

## 2018-10-23 PROCEDURE — 3331090002 HH PPS REVENUE DEBIT

## 2018-10-23 PROCEDURE — 3331090001 HH PPS REVENUE CREDIT

## 2018-10-24 ENCOUNTER — HOME CARE VISIT (OUTPATIENT)
Dept: SCHEDULING | Facility: HOME HEALTH | Age: 83
End: 2018-10-24
Payer: MEDICARE

## 2018-10-24 PROCEDURE — 3331090002 HH PPS REVENUE DEBIT

## 2018-10-24 PROCEDURE — 3331090001 HH PPS REVENUE CREDIT

## 2018-10-24 PROCEDURE — G0157 HHC PT ASSISTANT EA 15: HCPCS

## 2018-10-25 VITALS
RESPIRATION RATE: 18 BRPM | HEART RATE: 82 BPM | TEMPERATURE: 97.3 F | DIASTOLIC BLOOD PRESSURE: 82 MMHG | SYSTOLIC BLOOD PRESSURE: 141 MMHG

## 2018-10-25 PROCEDURE — 3331090002 HH PPS REVENUE DEBIT

## 2018-10-25 PROCEDURE — 3331090001 HH PPS REVENUE CREDIT

## 2018-10-26 PROCEDURE — 3331090002 HH PPS REVENUE DEBIT

## 2018-10-26 PROCEDURE — 3331090001 HH PPS REVENUE CREDIT

## 2018-10-27 PROCEDURE — 3331090002 HH PPS REVENUE DEBIT

## 2018-10-27 PROCEDURE — 3331090001 HH PPS REVENUE CREDIT

## 2018-10-28 VITALS
HEART RATE: 82 BPM | SYSTOLIC BLOOD PRESSURE: 132 MMHG | RESPIRATION RATE: 18 BRPM | DIASTOLIC BLOOD PRESSURE: 78 MMHG | TEMPERATURE: 97.2 F

## 2018-10-28 PROCEDURE — 3331090001 HH PPS REVENUE CREDIT

## 2018-10-28 PROCEDURE — 3331090002 HH PPS REVENUE DEBIT

## 2018-10-29 PROCEDURE — 3331090002 HH PPS REVENUE DEBIT

## 2018-10-29 PROCEDURE — 3331090001 HH PPS REVENUE CREDIT

## 2018-10-30 ENCOUNTER — HOME CARE VISIT (OUTPATIENT)
Dept: SCHEDULING | Facility: HOME HEALTH | Age: 83
End: 2018-10-30
Payer: MEDICARE

## 2018-10-30 PROCEDURE — 3331090002 HH PPS REVENUE DEBIT

## 2018-10-30 PROCEDURE — G0157 HHC PT ASSISTANT EA 15: HCPCS

## 2018-10-30 PROCEDURE — 3331090001 HH PPS REVENUE CREDIT

## 2018-10-31 PROCEDURE — 3331090002 HH PPS REVENUE DEBIT

## 2018-10-31 PROCEDURE — 3331090001 HH PPS REVENUE CREDIT

## 2018-11-01 VITALS
RESPIRATION RATE: 20 BRPM | TEMPERATURE: 98.2 F | DIASTOLIC BLOOD PRESSURE: 78 MMHG | SYSTOLIC BLOOD PRESSURE: 132 MMHG | HEART RATE: 82 BPM

## 2018-11-01 PROCEDURE — 3331090001 HH PPS REVENUE CREDIT

## 2018-11-01 PROCEDURE — 3331090002 HH PPS REVENUE DEBIT

## 2018-11-02 ENCOUNTER — HOME CARE VISIT (OUTPATIENT)
Dept: SCHEDULING | Facility: HOME HEALTH | Age: 83
End: 2018-11-02
Payer: MEDICARE

## 2018-11-02 VITALS
DIASTOLIC BLOOD PRESSURE: 80 MMHG | RESPIRATION RATE: 18 BRPM | SYSTOLIC BLOOD PRESSURE: 120 MMHG | TEMPERATURE: 98.2 F | HEART RATE: 68 BPM

## 2018-11-02 PROCEDURE — G0151 HHCP-SERV OF PT,EA 15 MIN: HCPCS

## 2018-11-02 PROCEDURE — 3331090001 HH PPS REVENUE CREDIT

## 2018-11-02 PROCEDURE — 3331090002 HH PPS REVENUE DEBIT

## 2018-11-03 PROCEDURE — 3331090002 HH PPS REVENUE DEBIT

## 2018-11-03 PROCEDURE — 3331090001 HH PPS REVENUE CREDIT

## 2018-11-04 PROCEDURE — 3331090001 HH PPS REVENUE CREDIT

## 2018-11-04 PROCEDURE — 3331090002 HH PPS REVENUE DEBIT

## 2018-11-05 PROCEDURE — 3331090001 HH PPS REVENUE CREDIT

## 2018-11-05 PROCEDURE — 3331090002 HH PPS REVENUE DEBIT

## 2018-11-20 RX ORDER — SODIUM CHLORIDE 0.9 % (FLUSH) 0.9 %
5-10 SYRINGE (ML) INJECTION EVERY 8 HOURS
Status: CANCELLED | OUTPATIENT
Start: 2018-11-20

## 2018-11-20 RX ORDER — SODIUM CHLORIDE 0.9 % (FLUSH) 0.9 %
5-10 SYRINGE (ML) INJECTION AS NEEDED
Status: CANCELLED | OUTPATIENT
Start: 2018-11-20

## 2018-11-26 NOTE — H&P
CC: Pain and numbness of the left hand HPI:  The patient is an 80-year-old right-hand dominant, retired white female. I saw her back on 09/18/2017, a little over a year ago. She had had a fracture of the left wrist, treated in  [place]  with casting that occurred about 3 years prior to me seeing her then. She complains of numbness and tingling into the left index finger and thumb and it has bothered her now for about 4 years. It started after the fracture of the wrist.  She has been dropping things and says her hand, particularly the thumb and index finger stay cool and have little feeling. She also has pain at night with aching and pain. When I saw her a year ago, I injected her carpal tunnel and she says she got some relief for awhile but that has returned to aching and hurting again as described. She has recently had a left hip replacement by Dr. Morales Morelos in 10/2018 and is doing well from that. She says now she wants to get her hand fixed. She is here with her  and daughter or granddaughter. PMH, SH & ROS:  This form has been filled out recently and is included in the  chart and is unchanged. PE:  The patient is an alert and elderly white female. She repeats herself frequently and has some apparent confusion. She has some mild deformity of the left wrist with a dorsal bony prominence and a little prominence over the distal ulna. Range of motion F/E is 65/85. She can close her fingers into a clenched fist and they extend fully. The elbow has good functional range of motion also. The circulation is good with good warmth of the fingers and brisk capillary refill in the fingertips. Neurologic exam shows the patient to have subjective decreased feeling in the tips of the thumb and index finger. She has intact sensation in the radial and ulnar nerve distribution.   She has marked thenar and intrinsic first dorsal interosseous atrophy on the left and lesser atrophy on the right.  She has less symptoms around the ulnar nerve at the elbow. Skin and nails are intact. 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 DIAGNOSIS:  Left carpal tunnel syndrome. DISPOSITION:  The problem was discussed with the patient and her family. I offered to reinject her carpal tunnel. She wants to go ahead with surgical treatment. We discussed that. I told her that it will not relieve the atrophy that she already has. It may not give her back her feeling but it will probably help with the pain, though that is not a definite either. The procedure can be done as an outpatient under an IV regional anesthetic. She wishes to proceed. We will go ahead and schedule her for the left carpal tunnel release.

## 2018-11-27 ENCOUNTER — ANESTHESIA EVENT (OUTPATIENT)
Dept: SURGERY | Age: 83
End: 2018-11-27
Payer: MEDICARE

## 2018-11-28 ENCOUNTER — ANESTHESIA (OUTPATIENT)
Dept: SURGERY | Age: 83
End: 2018-11-28
Payer: MEDICARE

## 2018-11-28 ENCOUNTER — HOSPITAL ENCOUNTER (OUTPATIENT)
Age: 83
Setting detail: OUTPATIENT SURGERY
Discharge: HOME | End: 2018-11-28
Attending: ORTHOPAEDIC SURGERY | Admitting: ORTHOPAEDIC SURGERY
Payer: MEDICARE

## 2018-11-28 VITALS
WEIGHT: 125 LBS | HEIGHT: 66 IN | TEMPERATURE: 97.7 F | RESPIRATION RATE: 16 BRPM | BODY MASS INDEX: 20.09 KG/M2 | HEART RATE: 74 BPM | DIASTOLIC BLOOD PRESSURE: 71 MMHG | SYSTOLIC BLOOD PRESSURE: 148 MMHG | OXYGEN SATURATION: 98 %

## 2018-11-28 DIAGNOSIS — G89.18 POST-OP PAIN: Primary | ICD-10-CM

## 2018-11-28 LAB — POTASSIUM BLD-SCNC: 2.9 MMOL/L (ref 3.5–5.1)

## 2018-11-28 PROCEDURE — 74011250636 HC RX REV CODE- 250/636

## 2018-11-28 PROCEDURE — 77030018836 HC SOL IRR NACL ICUM -A: Performed by: ORTHOPAEDIC SURGERY

## 2018-11-28 PROCEDURE — 84132 ASSAY OF SERUM POTASSIUM: CPT

## 2018-11-28 PROCEDURE — 77030000032 HC CUF TRNQT ZIMM -B: Performed by: ORTHOPAEDIC SURGERY

## 2018-11-28 PROCEDURE — 77030031139 HC SUT VCRL2 J&J -A: Performed by: ORTHOPAEDIC SURGERY

## 2018-11-28 PROCEDURE — 77030002986 HC SUT PROL J&J -A: Performed by: ORTHOPAEDIC SURGERY

## 2018-11-28 PROCEDURE — 76010000138 HC OR TIME 0.5 TO 1 HR: Performed by: ORTHOPAEDIC SURGERY

## 2018-11-28 PROCEDURE — 77030006605 HC BLD CRPL IN BIOM -C: Performed by: ORTHOPAEDIC SURGERY

## 2018-11-28 PROCEDURE — 76210000020 HC REC RM PH II FIRST 0.5 HR: Performed by: ORTHOPAEDIC SURGERY

## 2018-11-28 PROCEDURE — 76060000032 HC ANESTHESIA 0.5 TO 1 HR: Performed by: ORTHOPAEDIC SURGERY

## 2018-11-28 PROCEDURE — 77030039266 HC ADH SKN EXOFIN S2SG -A: Performed by: ORTHOPAEDIC SURGERY

## 2018-11-28 PROCEDURE — 74011250637 HC RX REV CODE- 250/637: Performed by: ANESTHESIOLOGY

## 2018-11-28 PROCEDURE — 76210000063 HC OR PH I REC FIRST 0.5 HR: Performed by: ORTHOPAEDIC SURGERY

## 2018-11-28 PROCEDURE — 74011250636 HC RX REV CODE- 250/636: Performed by: ANESTHESIOLOGY

## 2018-11-28 PROCEDURE — 74011000250 HC RX REV CODE- 250: Performed by: ORTHOPAEDIC SURGERY

## 2018-11-28 RX ORDER — FENTANYL CITRATE 50 UG/ML
INJECTION, SOLUTION INTRAMUSCULAR; INTRAVENOUS AS NEEDED
Status: DISCONTINUED | OUTPATIENT
Start: 2018-11-28 | End: 2018-11-28 | Stop reason: HOSPADM

## 2018-11-28 RX ORDER — POTASSIUM CHLORIDE 20 MEQ/1
40 TABLET, EXTENDED RELEASE ORAL
Status: COMPLETED | OUTPATIENT
Start: 2018-11-28 | End: 2018-11-28

## 2018-11-28 RX ORDER — HYDROCODONE BITARTRATE AND ACETAMINOPHEN 5; 325 MG/1; MG/1
1 TABLET ORAL AS NEEDED
Status: DISCONTINUED | OUTPATIENT
Start: 2018-11-28 | End: 2018-11-28 | Stop reason: HOSPADM

## 2018-11-28 RX ORDER — LIDOCAINE HYDROCHLORIDE 10 MG/ML
0.1 INJECTION INFILTRATION; PERINEURAL AS NEEDED
Status: DISCONTINUED | OUTPATIENT
Start: 2018-11-28 | End: 2018-11-28 | Stop reason: HOSPADM

## 2018-11-28 RX ORDER — FAMOTIDINE 20 MG/1
20 TABLET, FILM COATED ORAL ONCE
Status: COMPLETED | OUTPATIENT
Start: 2018-11-28 | End: 2018-11-28

## 2018-11-28 RX ORDER — SODIUM CHLORIDE 0.9 % (FLUSH) 0.9 %
5-10 SYRINGE (ML) INJECTION AS NEEDED
Status: DISCONTINUED | OUTPATIENT
Start: 2018-11-28 | End: 2018-11-28 | Stop reason: HOSPADM

## 2018-11-28 RX ORDER — ACETAMINOPHEN 500 MG
1000 TABLET ORAL
Status: DISCONTINUED | OUTPATIENT
Start: 2018-11-28 | End: 2018-11-28 | Stop reason: HOSPADM

## 2018-11-28 RX ORDER — SODIUM CHLORIDE, SODIUM LACTATE, POTASSIUM CHLORIDE, CALCIUM CHLORIDE 600; 310; 30; 20 MG/100ML; MG/100ML; MG/100ML; MG/100ML
150 INJECTION, SOLUTION INTRAVENOUS CONTINUOUS
Status: DISCONTINUED | OUTPATIENT
Start: 2018-11-28 | End: 2018-11-28 | Stop reason: HOSPADM

## 2018-11-28 RX ORDER — TRAMADOL HYDROCHLORIDE 50 MG/1
50 TABLET ORAL
Qty: 20 TAB | Refills: 0 | Status: SHIPPED | OUTPATIENT
Start: 2018-11-28 | End: 2019-01-06

## 2018-11-28 RX ORDER — SODIUM CHLORIDE 0.9 % (FLUSH) 0.9 %
5-10 SYRINGE (ML) INJECTION EVERY 8 HOURS
Status: DISCONTINUED | OUTPATIENT
Start: 2018-11-28 | End: 2018-11-28 | Stop reason: HOSPADM

## 2018-11-28 RX ORDER — FENTANYL CITRATE 50 UG/ML
100 INJECTION, SOLUTION INTRAMUSCULAR; INTRAVENOUS ONCE
Status: DISCONTINUED | OUTPATIENT
Start: 2018-11-28 | End: 2018-11-28 | Stop reason: HOSPADM

## 2018-11-28 RX ORDER — LIDOCAINE HYDROCHLORIDE AND EPINEPHRINE 5; 5 MG/ML; UG/ML
INJECTION, SOLUTION INFILTRATION; PERINEURAL AS NEEDED
Status: DISCONTINUED | OUTPATIENT
Start: 2018-11-28 | End: 2018-11-28 | Stop reason: HOSPADM

## 2018-11-28 RX ORDER — LIDOCAINE HYDROCHLORIDE 5 MG/ML
INJECTION, SOLUTION INFILTRATION; INTRAVENOUS AS NEEDED
Status: DISCONTINUED | OUTPATIENT
Start: 2018-11-28 | End: 2018-11-28 | Stop reason: HOSPADM

## 2018-11-28 RX ORDER — SODIUM CHLORIDE 9 MG/ML
50 INJECTION, SOLUTION INTRAVENOUS CONTINUOUS
Status: DISCONTINUED | OUTPATIENT
Start: 2018-11-28 | End: 2018-11-28 | Stop reason: HOSPADM

## 2018-11-28 RX ORDER — MIDAZOLAM HYDROCHLORIDE 1 MG/ML
2 INJECTION, SOLUTION INTRAMUSCULAR; INTRAVENOUS
Status: DISCONTINUED | OUTPATIENT
Start: 2018-11-28 | End: 2018-11-28 | Stop reason: HOSPADM

## 2018-11-28 RX ORDER — HYDROMORPHONE HYDROCHLORIDE 2 MG/ML
0.5 INJECTION, SOLUTION INTRAMUSCULAR; INTRAVENOUS; SUBCUTANEOUS
Status: DISCONTINUED | OUTPATIENT
Start: 2018-11-28 | End: 2018-11-28 | Stop reason: HOSPADM

## 2018-11-28 RX ADMIN — FAMOTIDINE 20 MG: 20 TABLET ORAL at 09:13

## 2018-11-28 RX ADMIN — POTASSIUM CHLORIDE 40 MEQ: 20 TABLET, EXTENDED RELEASE ORAL at 10:08

## 2018-11-28 RX ADMIN — LIDOCAINE HYDROCHLORIDE 27 ML: 5 INJECTION, SOLUTION INFILTRATION; INTRAVENOUS at 11:05

## 2018-11-28 RX ADMIN — FENTANYL CITRATE 25 MCG: 50 INJECTION, SOLUTION INTRAMUSCULAR; INTRAVENOUS at 10:59

## 2018-11-28 RX ADMIN — SODIUM CHLORIDE, SODIUM LACTATE, POTASSIUM CHLORIDE, AND CALCIUM CHLORIDE 150 ML/HR: 600; 310; 30; 20 INJECTION, SOLUTION INTRAVENOUS at 09:45

## 2018-11-28 NOTE — ANESTHESIA POSTPROCEDURE EVALUATION
Procedure(s): 
HAND CARPAL TUNNEL RELEASE LEFT. Anesthesia Post Evaluation Multimodal analgesia: multimodal analgesia not used between 6 hours prior to anesthesia start to PACU discharge Patient location during evaluation: bedside Patient participation: complete - patient participated Level of consciousness: awake and alert Pain management: adequate Airway patency: patent Anesthetic complications: no 
Cardiovascular status: hemodynamically stable Respiratory status: spontaneous ventilation Hydration status: euvolemic Comments: Patient stable and may discharge at this time. Visit Vitals /71 Pulse 74 Temp 36.5 °C (97.7 °F) Resp 16 Ht 5' 6\" (1.676 m) Wt 56.7 kg (125 lb) SpO2 98% BMI 20.18 kg/m²

## 2018-11-28 NOTE — OP NOTES
Carpal Tunnel Release    [facility]     11/28/2018    Indications: This is a 80 y.o. female who presents with right carpal tunnel syndrome. The patient was admitted for surgery as conservative measures have failed. Pre-operative Diagnosis:  LEFT CARPAL TUNNEL SYNDROME    Post-operative Diagnosis:  LEFT CARPAL TUNNEL SYNDROME    Procedure:   LEFT CARPAL TUNNEL RELEASE    Surgeon: Walter Caruso., MD    Anesthesia:   IV Regional    Procedure/Operative Note:  Appropriate informed consent was obtained previously in the office. A Lily block was given in the  left upper extremity. THe extremity was prepped and draped as a sterile field. A timeout was completed identifying the patient, procedure site and surgeon. Once confirmed by the operative team we proceeded. A slightly curved longitudinal incision was then made in the  left proximal palm. Dissection was carried down to the subcutaneous tissue. A small self retaining retractor was inserted and the palmar fascia opened with the tip of the scalpel. The transverse ligament was incised over 5-6 mm at the distal margin and the wrist then moderately extended over a folded towel.,  The qualifyor system was used to complete the release. First the single  was passed deep to the deep ligament into the carpal canal.  This was followed by the stripper instrument and then the double . Finally the ligamentatome was used to release the ligament from distal to proximal with a single smooth pass. The completeness of the release was checked by palpating with the single  as well as visually. The median nerve was intact and no masses or other abnormalities noted. There was noted to be considerable fraying of the FDP to the little finger. It was still in continuity but looked like it might rupture in the future. I sutured it side to side to the adjacent FDP to the ring finger with 3 horizontal matress stitches of 3-0 Prolene.   Small fascial bands distally were released with the tenotomy scissors. The margins of the canal were injected with 1% Lidocaine with epinephrine. The incision was closed with 4-0 Vicryl Rapide and surgical adhesive. Sterile dressings were applied. The patient tolerated the procedure well and was sent to the  in good condition.      Tourniquet Time:   Total Tourniquet Time Documented:  Forearm (Left) - 25 minutes  Total: Forearm (Left) - 25 minutes          Signed By: Joey Ruiz MD

## 2018-11-28 NOTE — ANESTHESIA PROCEDURE NOTES
Peripheral Block Start time: 11/28/2018 11:01 AM 
End time: 11/28/2018 11:07 AM 
Performed by: Jonathon Pires CRNA Authorized by: Anibal Encinas MD  
 
 
Pre-procedure: Indications: at surgeon's request, procedure for pain and primary anesthetic Preanesthetic Checklist: patient identified, risks and benefits discussed, site marked, timeout performed, anesthesia consent given and patient being monitored Timeout Time: 11:01 Block Type:  
Block Type:  [place] block Laterality:  Left Patient Position:  Flat Block Limb IV Checked: Yes   
Esmarch exsanguination: Yes Tourniquet Type:  Single Tourniquet Location:  Below elbow Tourniquet Inflated:  11/28/2018 11:04 AM 
Inflation (mmHg):  250 Limb w/o Radial Pulse: Yes Infused Agent:  Lidocaine 0.5% Volume Infused (mL):  27 Tourniquet Deflated:  11/28/2018 11:29 AM 
 
Assessment: 
 
Injection Assessment:  
Patient tolerance:  Patient tolerated the procedure well with no immediate complications

## 2018-11-28 NOTE — DISCHARGE INSTRUCTIONS
POST OP INSTRUCTIONS      1. Patient has appointment for 12/7/18 at 11:35 AM at the [facility]. 2.  For problems call [address], [phone]          Appointments only,  [phone]    3. Ice and elevate the surgical site. 4.  May remove dressings and wash in running water or shower. Then cover the incision with Band-aids. Do not submerge in bath or dish water. ACTIVITY  · As tolerated and as directed by your doctor. · Bathe or shower as directed by your doctor. DIET  · Clear liquids until no nausea or vomiting; then light diet for the first day. · Advance to regular diet on second day, unless your doctor orders otherwise. · If nausea and vomiting continues, call your doctor. PAIN  · Take pain medication as directed by your doctor. · Call your doctor if pain is NOT relieved by medication. · DO NOT take aspirin of blood thinners unless directed by your doctor. DRESSING CARE       CALL YOUR DOCTOR IF   · Excessive bleeding that does not stop after holding pressure over the area  · Temperature of 101 degrees F or above  · Excessive redness, swelling or bruising, and/ or green or yellow, smelly discharge from incision    AFTER ANESTHESIA   · For the first 24 hours: DO NOT Drive, Drink alcoholic beverages, or Make important decisions. · Be aware of dizziness following anesthesia and while taking pain medication. APPOINTMENT DATE/ TIME    YOUR DOCTOR'S PHONE NUMBER       DISCHARGE SUMMARY from Nurse    PATIENT INSTRUCTIONS:    After general anesthesia or intravenous sedation, for 24 hours or while taking prescription Narcotics:  · Limit your activities  · Do not drive and operate hazardous machinery  · Do not make important personal or business decisions  · Do  not drink alcoholic beverages  · If you have not urinated within 8 hours after discharge, please contact your surgeon on call.     *  Please give a list of your current medications to your Primary Care Provider. *  Please update this list whenever your medications are discontinued, doses are      changed, or new medications (including over-the-counter products) are added. *  Please carry medication information at all times in case of emergency situations. These are general instructions for a healthy lifestyle:    No smoking/ No tobacco products/ Avoid exposure to second hand smoke    Surgeon General's Warning:  Quitting smoking now greatly reduces serious risk to your health. Obesity, smoking, and sedentary lifestyle greatly increases your risk for illness    A healthy diet, regular physical exercise & weight monitoring are important for maintaining a healthy lifestyle    You may be retaining fluid if you have a history of heart failure or if you experience any of the following symptoms:  Weight gain of 3 pounds or more overnight or 5 pounds in a week, increased swelling in our hands or feet or shortness of breath while lying flat in bed. Please call your doctor as soon as you notice any of these symptoms; do not wait until your next office visit. Recognize signs and symptoms of STROKE:    F-face looks uneven    A-arms unable to move or move unevenly    S-speech slurred or non-existent    T-time-call 911 as soon as signs and symptoms begin-DO NOT go       Back to bed or wait to see if you get better-TIME IS BRAIN.

## 2018-11-28 NOTE — ANESTHESIA PREPROCEDURE EVALUATION
Anesthetic History No history of anesthetic complications Review of Systems / Medical History Patient summary reviewed and pertinent labs reviewed Pulmonary Within defined limits Neuro/Psych Within defined limits Cardiovascular Hypertension: well controlled Hyperlipidemia Exercise tolerance: <4 METS: Limited by hip pain Comments: Denies recent CP, SOB or Palpitations Stress test 12/16 normal. 
 
Echo 2017 showing EF 60% and mild AI, mild MR.  
GI/Hepatic/Renal 
  
GERD: well controlled Endo/Other Hypothyroidism: well controlled Arthritis Other Findings Physical Exam 
 
Airway Mallampati: II 
TM Distance: < 4 cm Neck ROM: normal range of motion Mouth opening: Normal 
 
 Cardiovascular Regular rate and rhythm,  S1 and S2 normal,  no murmur, click, rub, or gallop Dental 
No notable dental hx Pulmonary Breath sounds clear to auscultation Abdominal 
GI exam deferred Other Findings Anesthetic Plan ASA: 2 Anesthesia type: total IV anesthesia - [place] block Anesthetic plan and risks discussed with: Patient and Son / Daughter

## 2018-11-28 NOTE — INTERVAL H&P NOTE
H&P Update: 
Tana Harrington was seen and examined. Chest:   Good respiratory motion. No SOB 
C/V:   RR&R History and physical has been reviewed. The patient has been examined. There have been no significant clinical changes since the completion of the originally dated History and Physical. 
Patient identified by surgeon; surgical site was confirmed by patient and surgeon.  
The pt is to have an out patient carpal tunnel release of the left wrist. 
 
Signed By: Paxton Nicholas MD   
 November 28, 2018 9:22 AM

## 2019-01-06 ENCOUNTER — HOSPITAL ENCOUNTER (INPATIENT)
Age: 84
LOS: 2 days | Discharge: HOME HEALTH | DRG: 087 | End: 2019-01-08
Attending: EMERGENCY MEDICINE | Admitting: INTERNAL MEDICINE
Payer: MEDICARE

## 2019-01-06 ENCOUNTER — APPOINTMENT (OUTPATIENT)
Dept: CT IMAGING | Age: 84
DRG: 087 | End: 2019-01-06
Attending: EMERGENCY MEDICINE
Payer: MEDICARE

## 2019-01-06 DIAGNOSIS — S06.310A: Primary | ICD-10-CM

## 2019-01-06 PROBLEM — S06.300A TRAUMATIC INTRACRANIAL HEMORRHAGE WITHOUT LOSS OF CONSCIOUSNESS (HCC): Status: ACTIVE | Noted: 2019-01-06

## 2019-01-06 PROBLEM — E03.9 ACQUIRED HYPOTHYROIDISM: Status: ACTIVE | Noted: 2019-01-06

## 2019-01-06 PROBLEM — I10 ESSENTIAL HYPERTENSION: Status: ACTIVE | Noted: 2019-01-06
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
## 2019-01-06 PROBLEM — I61.9 INTRAPARENCHYMAL HEMORRHAGE OF BRAIN (HCC): Status: ACTIVE | Noted: 2019-01-06

## 2019-01-06 LAB
ANION GAP SERPL CALC-SCNC: 7 MMOL/L (ref 7–16)
BASOPHILS # BLD: 0.1 K/UL (ref 0–0.2)
BASOPHILS NFR BLD: 1 % (ref 0–2)
BUN SERPL-MCNC: 16 MG/DL (ref 8–23)
CALCIUM SERPL-MCNC: 8.8 MG/DL (ref 8.3–10.4)
CHLORIDE SERPL-SCNC: 98 MMOL/L (ref 98–107)
CO2 SERPL-SCNC: 28 MMOL/L (ref 21–32)
CREAT SERPL-MCNC: 0.91 MG/DL (ref 0.6–1)
DIFFERENTIAL METHOD BLD: ABNORMAL
EOSINOPHIL # BLD: 0.1 K/UL (ref 0–0.8)
EOSINOPHIL NFR BLD: 1 % (ref 0.5–7.8)
ERYTHROCYTE [DISTWIDTH] IN BLOOD BY AUTOMATED COUNT: 12.9 % (ref 11.9–14.6)
GLUCOSE SERPL-MCNC: 88 MG/DL (ref 65–100)
HCT VFR BLD AUTO: 30.4 % (ref 35.8–46.3)
HGB BLD-MCNC: 9.9 G/DL (ref 11.7–15.4)
IMM GRANULOCYTES # BLD: 0 K/UL (ref 0–0.5)
IMM GRANULOCYTES NFR BLD AUTO: 1 % (ref 0–5)
INR PPP: 1
LYMPHOCYTES # BLD: 1.4 K/UL (ref 0.5–4.6)
LYMPHOCYTES NFR BLD: 18 % (ref 13–44)
MCH RBC QN AUTO: 30.4 PG (ref 26.1–32.9)
MCHC RBC AUTO-ENTMCNC: 32.6 G/DL (ref 31.4–35)
MCV RBC AUTO: 93.3 FL (ref 79.6–97.8)
MONOCYTES # BLD: 0.6 K/UL (ref 0.1–1.3)
MONOCYTES NFR BLD: 8 % (ref 4–12)
NEUTS SEG # BLD: 5.5 K/UL (ref 1.7–8.2)
NEUTS SEG NFR BLD: 71 % (ref 43–78)
NRBC # BLD: 0 K/UL (ref 0–0.2)
PLATELET # BLD AUTO: 208 K/UL (ref 150–450)
PMV BLD AUTO: 9.8 FL (ref 9.4–12.3)
POTASSIUM SERPL-SCNC: 3.7 MMOL/L (ref 3.5–5.1)
PROTHROMBIN TIME: 13.2 SEC (ref 11.7–14.5)
RBC # BLD AUTO: 3.26 M/UL (ref 4.05–5.2)
SODIUM SERPL-SCNC: 133 MMOL/L (ref 136–145)
WBC # BLD AUTO: 7.7 K/UL (ref 4.3–11.1)

## 2019-01-06 PROCEDURE — 74011250637 HC RX REV CODE- 250/637: Performed by: INTERNAL MEDICINE

## 2019-01-06 PROCEDURE — 65610000001 HC ROOM ICU GENERAL

## 2019-01-06 PROCEDURE — 74011000302 HC RX REV CODE- 302: Performed by: INTERNAL MEDICINE

## 2019-01-06 PROCEDURE — 85610 PROTHROMBIN TIME: CPT

## 2019-01-06 PROCEDURE — 99284 EMERGENCY DEPT VISIT MOD MDM: CPT | Performed by: EMERGENCY MEDICINE

## 2019-01-06 PROCEDURE — 85025 COMPLETE CBC W/AUTO DIFF WBC: CPT

## 2019-01-06 PROCEDURE — 80048 BASIC METABOLIC PNL TOTAL CA: CPT

## 2019-01-06 PROCEDURE — 70450 CT HEAD/BRAIN W/O DYE: CPT

## 2019-01-06 PROCEDURE — 77030032490 HC SLV COMPR SCD KNE COVD -B

## 2019-01-06 PROCEDURE — 86580 TB INTRADERMAL TEST: CPT | Performed by: INTERNAL MEDICINE

## 2019-01-06 RX ORDER — LORAZEPAM 1 MG/1
1 TABLET ORAL
Status: DISCONTINUED | OUTPATIENT
Start: 2019-01-06 | End: 2019-01-08 | Stop reason: HOSPADM

## 2019-01-06 RX ORDER — SODIUM CHLORIDE 0.9 % (FLUSH) 0.9 %
5-10 SYRINGE (ML) INJECTION EVERY 8 HOURS
Status: DISCONTINUED | OUTPATIENT
Start: 2019-01-06 | End: 2019-01-08 | Stop reason: HOSPADM

## 2019-01-06 RX ORDER — SODIUM CHLORIDE 0.9 % (FLUSH) 0.9 %
5-10 SYRINGE (ML) INJECTION AS NEEDED
Status: DISCONTINUED | OUTPATIENT
Start: 2019-01-06 | End: 2019-01-08 | Stop reason: HOSPADM

## 2019-01-06 RX ORDER — NICARDIPINE HYDROCHLORIDE 0.1 MG/ML
5-15 INJECTION INTRAVENOUS
Status: DISCONTINUED | OUTPATIENT
Start: 2019-01-06 | End: 2019-01-06 | Stop reason: SDUPTHER

## 2019-01-06 RX ORDER — LABETALOL HYDROCHLORIDE 5 MG/ML
20 INJECTION, SOLUTION INTRAVENOUS ONCE
Status: DISCONTINUED | OUTPATIENT
Start: 2019-01-06 | End: 2019-01-06

## 2019-01-06 RX ORDER — LEVETIRACETAM 500 MG/1
500 TABLET ORAL 2 TIMES DAILY
Status: DISCONTINUED | OUTPATIENT
Start: 2019-01-06 | End: 2019-01-08 | Stop reason: HOSPADM

## 2019-01-06 RX ORDER — SERTRALINE HYDROCHLORIDE 100 MG/1
100 TABLET, FILM COATED ORAL DAILY
Status: DISCONTINUED | OUTPATIENT
Start: 2019-01-07 | End: 2019-01-08 | Stop reason: HOSPADM

## 2019-01-06 RX ORDER — ACETAMINOPHEN 500 MG
1000 TABLET ORAL
Status: DISCONTINUED | OUTPATIENT
Start: 2019-01-06 | End: 2019-01-08 | Stop reason: HOSPADM

## 2019-01-06 RX ORDER — SIMVASTATIN 40 MG/1
40 TABLET, FILM COATED ORAL
Status: DISCONTINUED | OUTPATIENT
Start: 2019-01-06 | End: 2019-01-08 | Stop reason: HOSPADM

## 2019-01-06 RX ORDER — PANTOPRAZOLE SODIUM 40 MG/1
40 TABLET, DELAYED RELEASE ORAL
Status: DISCONTINUED | OUTPATIENT
Start: 2019-01-07 | End: 2019-01-08 | Stop reason: HOSPADM

## 2019-01-06 RX ORDER — ONDANSETRON 2 MG/ML
4 INJECTION INTRAMUSCULAR; INTRAVENOUS
Status: DISCONTINUED | OUTPATIENT
Start: 2019-01-06 | End: 2019-01-08 | Stop reason: HOSPADM

## 2019-01-06 RX ORDER — AMOXICILLIN 250 MG
2 CAPSULE ORAL DAILY
Status: DISCONTINUED | OUTPATIENT
Start: 2019-01-07 | End: 2019-01-08 | Stop reason: HOSPADM

## 2019-01-06 RX ORDER — LEVOTHYROXINE SODIUM 100 UG/1
100 TABLET ORAL
Status: DISCONTINUED | OUTPATIENT
Start: 2019-01-07 | End: 2019-01-08 | Stop reason: HOSPADM

## 2019-01-06 RX ORDER — POTASSIUM CHLORIDE 20 MEQ/1
20 TABLET, EXTENDED RELEASE ORAL DAILY
Status: DISCONTINUED | OUTPATIENT
Start: 2019-01-07 | End: 2019-01-08 | Stop reason: HOSPADM

## 2019-01-06 RX ORDER — FUROSEMIDE 20 MG/1
20 TABLET ORAL DAILY
Status: DISCONTINUED | OUTPATIENT
Start: 2019-01-07 | End: 2019-01-08 | Stop reason: HOSPADM

## 2019-01-06 RX ORDER — SODIUM CHLORIDE 0.9 % (FLUSH) 0.9 %
5-10 SYRINGE (ML) INJECTION EVERY 8 HOURS
Status: DISCONTINUED | OUTPATIENT
Start: 2019-01-06 | End: 2019-01-08 | Stop reason: SDUPTHER

## 2019-01-06 RX ORDER — CHOLECALCIFEROL (VITAMIN D3) 125 MCG
3000 CAPSULE ORAL
Status: DISCONTINUED | OUTPATIENT
Start: 2019-01-06 | End: 2019-01-08 | Stop reason: HOSPADM

## 2019-01-06 RX ADMIN — TUBERCULIN PURIFIED PROTEIN DERIVATIVE 5 UNITS: 5 INJECTION, SOLUTION INTRADERMAL at 18:39

## 2019-01-06 RX ADMIN — Medication 10 ML: at 18:39

## 2019-01-06 RX ADMIN — ACETAMINOPHEN 1000 MG: 500 TABLET, FILM COATED ORAL at 16:34

## 2019-01-06 RX ADMIN — LACTASE TAB 3000 UNIT 3000 UNITS: 3000 TAB at 18:38

## 2019-01-06 RX ADMIN — SIMVASTATIN 40 MG: 40 TABLET, FILM COATED ORAL at 21:19

## 2019-01-06 RX ADMIN — Medication 5 ML: at 22:00

## 2019-01-06 RX ADMIN — LORAZEPAM 1 MG: 1 TABLET ORAL at 21:19

## 2019-01-06 RX ADMIN — LEVETIRACETAM 500 MG: 500 TABLET ORAL at 18:37

## 2019-01-06 NOTE — H&P
HOSPITALIST H&PNAME:  Deysi Sena Age:  80 y.o. 
:   1932 MRN:   [id number] PCP: Aime Patel MD 
Consulting MD: Treatment Team: Attending Provider: Yane Garcia MD 
HPI:  
Carley Ramirez is an 81 yo F with history of depression/anxiety, leg edema, limited mobility and recent L hip arthroplasty on 10/10/18, who presents to the ED after falling in her [employer] parking lot earlier today. She states she was ambulating with a cane and reached out to grab her 's arm for stabilization and fell to the left. She hit the front of her L head. Denies loss of consciousness. Currently, she is alert and oriented x 4, though she is extremely hard of hearing and tangential in conversation. She denies severe headache, but reports some pain over abrasion/bruise to L forehead. In the ED, CT head consistent with 2.2 cm x 2 cm R frontal intraparenchymal hemorrhage (per neurosurgeon's read of CT). Granddaughter, Mariajose Concepcion, came towards end of evaluation. States that there was some recent confusion over her meds and she stopped taking levothyroxine and sertraline. Mrs. Natalia Hagan was under the impression that Dr. Yousuf Curtis told her to stop, but this does not appear to be the case (angelley confusion due to difficulty hearing and Ashley Emery did not go to last visit with her like she normally does). Hospitalist service asked to admit for intraparenchymal bleed (R frontal). Complete ROS done and is as stated in HPI or otherwise negative Past Medical History:  
Diagnosis Date  Anxiety   
 managed with medication  Decreased mobility   
 using rollator and wheelchair  Edema   
 bilateral feet/ankles (takes lasix PRN)  GERD (gastroesophageal reflux disease)   
 managed with medication  [place] (hard of hearing) does not wear hearing aids  Hx of echocardiogram 2017 EF 60.8%, mild aortic valve sclerosis, mild AR, mild MR  
 Hyperlipidemia  Hypertension managed with medication  Hypothyroid   
 managed with medication  Osteoarthritis  Pneumonia 2003 Past Surgical History:  
Procedure Laterality Date  HX HIP REPLACEMENT Right 2003  HX ORTHOPAEDIC Left Hip replacement  HX OTHER SURGICAL  2013  
 lung tumor Prior to Admission Medications Prescriptions Last Dose Informant Patient Reported? Taking? LORazepam (ATIVAN) 1 mg tablet   No Yes Sig: Take 1 Tab by mouth daily as needed for Anxiety. acetaminophen (TYLENOL) 500 mg tablet   No Yes Sig: Take 2 Tabs by mouth every six (6) hours. cyanocobalamin (VITAMIN B12) 500 mcg tablet   Yes Yes Sig: Take 500 mcg by mouth daily. furosemide (LASIX) 20 mg tablet   No Yes Sig: TAKE 2 TABLET BY MOUTH DAILY UNTIL SWELLING IS GONE FROM BOTH LEGS  
lactase (LACTAID) 3,000 unit tablet   No Yes Sig: Pt may use up to three times daily as needed with meals. levothyroxine (SYNTHROID) 100 mcg tablet   No Yes Sig: TAKE 1 TABLET BY MOUTH DAILY BEFORE BREAKFAST  
losartan-hydroCHLOROthiazide (HYZAAR) 50-12.5 mg per tablet   No Yes Sig: Take 1 Tab by mouth daily. miscellaneous medical supply misc   No Yes Sig: Zippered pair of compression stocking for lower extremity edema. Pt says cannot get on other type. (1 pair)  
miscellaneous medical supply misc   No Yes Sig: Wheelchair, mechanical for ambulation, hip pain, poor ambulation  
miscellaneous medical supply misc   No Yes Sig: Handicap Placard - use for poor ambulation. omeprazole (PRILOSEC) 40 mg capsule   No Yes Sig: Take 1 Cap by mouth daily. polyethylene glycol (MIRALAX) 17 gram/dose powder   No Yes Sig: Take 17 g by mouth daily. 1 tablespoon with 8 oz of water daily Patient taking differently: Take 17 g by mouth as needed. 1 tablespoon with 8 oz of water daily  
potassium chloride (KLOR-CON) 10 mEq tablet   No Yes Sig: TAKE 1 TABLET BY MOUTH TWICE DAILY ONLY WHEN TAKING LASIX senna-docusate (PERICOLACE) 8.6-50 mg per tablet   No Yes Sig: Take 2 Tabs by mouth daily. sertraline (ZOLOFT) 100 mg tablet   No Yes Sig: Take 1 Tab by mouth daily. Patient taking differently: Take 100 mg by mouth every morning. simvastatin (ZOCOR) 40 mg tablet   No Yes Sig: TAKE 1 TABLET BY MOUTH EVERY NIGHT Facility-Administered Medications: None Allergies Allergen Reactions  Amoxicillin Rash  Penicillins Other (comments) \"Mouth breaks out\"  Sulfa (Sulfonamide Antibiotics) Other (comments) Social History Tobacco Use  Smoking status: Never Smoker  Smokeless tobacco: Never Used Substance Use Topics  Alcohol use: No  
  
Family History Problem Relation Age of Onset  Heart Disease Mother  Heart Disease Father Objective:  
 
Visit Vitals /72 (BP 1 Location: Left arm) Pulse 63 Temp 98.2 °F (36.8 °C) Resp 18 Ht 5' 6\" (1.676 m) Wt 57.2 kg (126 lb) SpO2 97% BMI 20.34 kg/m² Temp (24hrs), Av.9 °F (36.6 °C), Min:97.5 °F (36.4 °C), Max:98.2 °F (36.8 °C) Patient Vitals for the past 24 hrs: 
 Temp Pulse Resp BP SpO2  
19 98.2 °F (36.8 °C) 63 18 136/72 97 % 19 1656  61 (!) 52 134/61 94 % 19 1631  62 19 139/76 97 % 19 1439  69  142/68 94 % 19 1144 97.5 °F (36.4 °C) 74 16 151/87 99 % Oxygen Therapy O2 Sat (%): 97 % (19) Pulse via Oximetry: 63 beats per minute (19) O2 Device: Room air (19) Physical Exam: 
General:    Alert, cooperative, no distress, elderly, very hard of hearing Head:   Slight laceration of L forehead with surrounding bruising Nose:  Nares normal. No drainage or sinus tenderness. Lungs:   Clear to auscultation bilaterally. No Wheezing or Rhonchi. No rales. Heart:   Regular rate and rhythm,  no murmur, rub or gallop. Abdomen:   Soft, non-tender. Not distended.   Bowel sounds normal.  
 Extremities: No cyanosis. No edema. No clubbing Skin:     Texture, turgor normal. No rashes or lesions. Not Jaundiced Neurologic: Alert and oriented x 3, no focal deficits Data Review:  
Recent Results (from the past 24 hour(s)) CBC WITH AUTOMATED DIFF Collection Time: 01/06/19  1:57 PM  
Result Value Ref Range WBC 7.7 4.3 - 11.1 K/uL  
 RBC 3.26 (L) 4.05 - 5.2 M/uL HGB 9.9 (L) 11.7 - 15.4 g/dL HCT 30.4 (L) 35.8 - 46.3 % MCV 93.3 79.6 - 97.8 FL  
 MCH 30.4 26.1 - 32.9 PG  
 MCHC 32.6 31.4 - 35.0 g/dL  
 RDW 12.9 11.9 - 14.6 % PLATELET 675 026 - 429 K/uL MPV 9.8 9.4 - 12.3 FL ABSOLUTE NRBC 0.00 0.0 - 0.2 K/uL  
 DF AUTOMATED NEUTROPHILS 71 43 - 78 % LYMPHOCYTES 18 13 - 44 % MONOCYTES 8 4.0 - 12.0 % EOSINOPHILS 1 0.5 - 7.8 % BASOPHILS 1 0.0 - 2.0 % IMMATURE GRANULOCYTES 1 0.0 - 5.0 %  
 ABS. NEUTROPHILS 5.5 1.7 - 8.2 K/UL  
 ABS. LYMPHOCYTES 1.4 0.5 - 4.6 K/UL  
 ABS. MONOCYTES 0.6 0.1 - 1.3 K/UL  
 ABS. EOSINOPHILS 0.1 0.0 - 0.8 K/UL  
 ABS. BASOPHILS 0.1 0.0 - 0.2 K/UL  
 ABS. IMM. GRANS. 0.0 0.0 - 0.5 K/UL METABOLIC PANEL, BASIC Collection Time: 01/06/19  1:57 PM  
Result Value Ref Range Sodium 133 (L) 136 - 145 mmol/L Potassium 3.7 3.5 - 5.1 mmol/L Chloride 98 98 - 107 mmol/L  
 CO2 28 21 - 32 mmol/L Anion gap 7 7 - 16 mmol/L Glucose 88 65 - 100 mg/dL BUN 16 8 - 23 MG/DL Creatinine 0.91 0.6 - 1.0 MG/DL  
 GFR est AA >60 >60 ml/min/1.73m2 GFR est non-AA >60 >60 ml/min/1.73m2 Calcium 8.8 8.3 - 10.4 MG/DL PROTHROMBIN TIME + INR Collection Time: 01/06/19  1:57 PM  
Result Value Ref Range Prothrombin time 13.2 11.7 - 14.5 sec INR 1.0 Imaging [facility] CT HEAD WO CONT Final Result IMPRESSION: 1. LARGE LEFT FRONTAL SCALP HEMATOMA WITHOUT UNDERLYING CALVARIAL FRACTURE.   
  
2.  MULTIPLE SMALL HIGH-ATTENUATION LESIONS NEAR THE [place]AG[place]-WHITE MATTER JUNCTION  
 IN THE RIGHT CEREBRAL HEMISPHERE COULD CONCEIVABLY REPRESENT CONTRECOUP  
HEMORRHAGIC CONTUSIONS BUT MUST BE CONSIDERED SUSPICIOUS FOR HEMORRHAGIC OR  
MELANOMA METASTASES UNTIL PROVEN OTHERWISE THERE IS 80YEAR-OLD. 3.  ATROPHY WITH SMALL VESSEL ISCHEMIC DISEASE. THIS CASE WAS REVIEWED IN CONSULTATION WITH COLLEAGUES. CT HEAD WO CONT    (Results Pending) Assessment and Plan: Active Hospital Problems Diagnosis Date Noted  Traumatic intracranial hemorrhage without loss of consciousness ([facility] [address] 01/06/2019 Priority: 1 - One  
 Fall 01/06/2019  Essential hypertension 01/06/2019  Acquired hypothyroidism 01/06/2019  Decreased ambulation status 11/18/2015  Anxiety and depression 06/30/2015 PLAN 
·  Jeremiah to ICU for traumatic R frontal intraparenchymal hemorrhage. · Bedrest for now until cleared by neurosurgery for activity. · Repeat CT head tomorrow AM.  Needs delayed MRI brain to evaluate for mets. · Frequent neuro checks. · Start Keppra 500 mg PO BID x 7 days for seizure prevention (per neurosurgery recs). · Consult neurosurgery for tomorrow- Dr. Kathia Nicole has already reviewed imaging. · Cardene gtt if needed to keep systolic BP < 018. · HTN- continue home meds. · Hypothyroidism- continue levothyroxine. · Depression with anxiety- continue sertraline and prn lorazepam. 
 
HCPOA:  Milla Lee (granddaughter) [phone] Code Status: Full Anticipated discharge: 2-3 days pending clinical course Signed By: Harsh Gu MD   
 January 6, 2019

## 2019-01-06 NOTE — ED TRIAGE NOTES
Pt states she felt she was going to fall and she reached for her  but fell anyways and hit her head. Pt has a large hematoma to the left temportal/ left forehead area with an abrasion. Pt had surgery 3 weeks ago on her left arm/ hand, but denies injuring the arm or hand in the fall. Pt sometimes speaks inappropriately, but is A&O x 4 in triage. Pt denies taking blood thinners. Pt appears to be [place].

## 2019-01-06 NOTE — PROGRESS NOTES
NEUROSURGERY PLAN OF CARE NOTE:  
 
Chart and Imaging Reviewed: Patient Discussed with Dr. Adrianne Sena 80 y.o. female presented to [facility] following fall from standing height. I have independently reviewed and interpreted the CT Head WO contrast that demonstrates a well circumscribe right frontal intraparenchymal hemorrhage measuring 2.2 cm x 2 cm without significant surrounding edema, and multiple small scattered contusion. Given the recent fall this hemorrhage is likely a traumatic hemorrhagic contusion. In a delayed fashion patient should undergo an MRI Brain WWO contrast to rule out hemorrhagic metastasis. No acute neurosurgical intervention necessary at this time. Recommend Keppra 500 mg BID for seven days for seizure prophylaxis. Please obtain a repeat CT Head WO in the am. Please call with questions or concerns. Full consult note and evaluation to follow in the am.  
 
Tammy Daniels. [facility]

## 2019-01-06 NOTE — ED TRIAGE NOTES
Pt arrived via ems and is w/c outside of triage. EMS reports fall at [employer] when walking to car because her legs get tired when she sits too long. Pt reported to EMS that she hit her head and has a knot on her left temple per EMS and was slightly bleeding. EMS states no LOC or AMS and pt denies being on blood thinners.

## 2019-01-06 NOTE — ED PROVIDER NOTES
80-year-old white female presents after a fall. She states she was walking out of [employer] around 10:30 this morning, she reached to grab her 's arm but missed and this caused her to lose her balance and fall. She struck the left side of her forehead on the ground. No loss of consciousness. No neck pain or back pain. She did have bleeding from an abrasion but this has resolved. Since the fall she has not had any nausea or vomiting. She has been able to ambulate and has no hip pain. The history is provided by the patient. Fall Associated symptoms include headaches. Pertinent negatives include no fever, no abdominal pain and no vomiting. Past Medical History:  
Diagnosis Date  Anxiety   
 managed with medication  Decreased mobility   
 using rollator and wheelchair  Edema   
 bilateral feet/ankles (takes lasix PRN)  GERD (gastroesophageal reflux disease)   
 managed with medication  [place] (hard of hearing) does not wear hearing aids  Hx of echocardiogram 01/04/2017 EF 60.8%, mild aortic valve sclerosis, mild AR, mild MR  
 Hyperlipidemia  Hypertension   
 managed with medication  Hypothyroid   
 managed with medication  Osteoarthritis  Pneumonia 2003 Past Surgical History:  
Procedure Laterality Date  HX HIP REPLACEMENT Right 2003  HX ORTHOPAEDIC Left Hip replacement  HX OTHER SURGICAL  2013  
 lung tumor Family History:  
Problem Relation Age of Onset  Heart Disease Mother  Heart Disease Father Social History Socioeconomic History  Marital status:  Spouse name: Not on file  Number of children: Not on file  Years of education: Not on file  Highest education level: Not on file Social Needs  Financial resource strain: Not on file  Food insecurity - worry: Not on file  Food insecurity - inability: Not on file  Transportation needs - medical: Not on file  Transportation needs - non-medical: Not on file Occupational History  Not on file Tobacco Use  Smoking status: Never Smoker  Smokeless tobacco: Never Used Substance and Sexual Activity  Alcohol use: No  
 Drug use: No  
 Sexual activity: Not on file Other Topics Concern  Not on file Social History Narrative  Not on file ALLERGIES: Amoxicillin; Penicillins; and Sulfa (sulfonamide antibiotics) Review of Systems Constitutional: Negative for fever. HENT: Negative for congestion. Respiratory: Negative for cough and shortness of breath. Cardiovascular: Negative for chest pain. Gastrointestinal: Negative for abdominal pain and vomiting. Musculoskeletal: Negative for back pain and neck pain. Neurological: Positive for headaches. Vitals:  
 01/06/19 1144 BP: 151/87 Pulse: 74 Resp: 16 Temp: 97.5 °F (36.4 °C) SpO2: 99% Weight: 57.2 kg (126 lb) Height: 5' 6\" (1.676 m) Physical Exam  
Constitutional: She is oriented to person, place, and time. She appears well-developed and well-nourished. HENT:  
Head: Normocephalic. Abrasion and hematoma left forehead. Eyes: Conjunctivae and EOM are normal. Pupils are equal, round, and reactive to light. Neck: Normal range of motion. Neck supple. No tenderness Cardiovascular: Normal rate and regular rhythm. Pulmonary/Chest: Effort normal and breath sounds normal.  
Abdominal: Soft. There is no tenderness. Musculoskeletal:  
Symmetric lower extremity edema. Range of motion to all joints. No tenderness. Neurological: She is alert and oriented to person, place, and time. No cranial nerve deficit. She exhibits normal muscle tone. Coordination normal.  
Skin: Skin is warm and dry. Psychiatric: She has a normal mood and affect. Her behavior is normal.  
Nursing note and vitals reviewed. MDM Number of Diagnoses or Management Options Diagnosis management comments: Head CT shows left sided scalp hematoma and multiple high attenuation lesions near the gray-white matter junction in the right cerebral hemisphere. Could be a contrecoup hemorrhagic contusions however cannot rule out hemorrhagic metastasis. As the patient's fall was mechanical and did involve head trauma suspect this is hemorrhagic contusions. Blood work is pending. We will admit for observation. Amount and/or Complexity of Data Reviewed Clinical lab tests: ordered and reviewed Tests in the radiology section of CPT®: ordered and reviewed Discuss the patient with other providers: yes Independent visualization of images, tracings, or specimens: yes Risk of Complications, Morbidity, and/or Mortality Presenting problems: moderate Diagnostic procedures: moderate Management options: moderate Procedures

## 2019-01-06 NOTE — PROGRESS NOTES
LEAPFROG PROTOCOL NOTE Dimitrios Vallejo 1/6/2019 The patient is currently in the critical care setting managed by Dr. Johana Greer with a traumatic intraparenchymal bleed. The patient's chart is reviewed and the patient is discussed with the staff. Patient is currently hemodynamically stable. Patient has no needs identified for Intensivist management in the critical care setting at this time. Please notify us if can be of assistance. No charge billed to the patient. Thank you.  
 
Kathleen Blair MD

## 2019-01-06 NOTE — PROGRESS NOTES
Patient being admitted to floor from ER Will follow Burt Sales, staff [address], [id number] [facility] [address]  /   [email or web address]

## 2019-01-06 NOTE — PROGRESS NOTES
TRANSFER - IN REPORT: 
 
Verbal report received from Joey Sandoval, [facility] (name) on Faviola Arshad  being received from ER (unit) for routine progression of care Report consisted of patients Situation, Background, Assessment and  
Recommendations(SBAR). Information from the following report(s) SBAR, Kardex, MAR, Recent Results, Alarm Parameters  and Quality Measures was reviewed with the receiving nurse. Opportunity for questions and clarification was provided. Assessment completed upon patients arrival to unit and care assumed.

## 2019-01-06 NOTE — ED NOTES
TRANSFER - OUT REPORT: 
 
Verbal report given to Chelle Narayanan (name) on Diony Tompkins  being transferred to [facility] (unit) for routine progression of care Report consisted of patients Situation, Background, Assessment and  
Recommendations(SBAR). Information from the following report(s) SBAR was reviewed with the receiving nurse. Lines:  
Peripheral IV 01/06/19 Left Antecubital (Active) Site Assessment Clean, dry, & intact 1/6/2019  1:59 PM  
Phlebitis Assessment 0 1/6/2019  1:59 PM  
Infiltration Assessment 0 1/6/2019  1:59 PM  
Dressing Status Clean, dry, & intact 1/6/2019  1:59 PM  
Dressing Type 4 X 4 1/6/2019  1:59 PM  
  
 
Opportunity for questions and clarification was provided. Patient transported with: 
 Monitor

## 2019-01-07 ENCOUNTER — APPOINTMENT (OUTPATIENT)
Dept: CT IMAGING | Age: 84
DRG: 087 | End: 2019-01-07
Attending: INTERNAL MEDICINE
Payer: MEDICARE

## 2019-01-07 LAB
ANION GAP SERPL CALC-SCNC: 4 MMOL/L (ref 7–16)
BUN SERPL-MCNC: 14 MG/DL (ref 8–23)
CALCIUM SERPL-MCNC: 8.2 MG/DL (ref 8.3–10.4)
CHLORIDE SERPL-SCNC: 100 MMOL/L (ref 98–107)
CO2 SERPL-SCNC: 30 MMOL/L (ref 21–32)
CREAT SERPL-MCNC: 0.82 MG/DL (ref 0.6–1)
GLUCOSE SERPL-MCNC: 72 MG/DL (ref 65–100)
MM INDURATION POC: 0 MM (ref 0–5)
POTASSIUM SERPL-SCNC: 3.4 MMOL/L (ref 3.5–5.1)
PPD POC: NORMAL NEGATIVE
SODIUM SERPL-SCNC: 134 MMOL/L (ref 136–145)

## 2019-01-07 PROCEDURE — 65660000000 HC RM CCU STEPDOWN

## 2019-01-07 PROCEDURE — 77030032490 HC SLV COMPR SCD KNE COVD -B

## 2019-01-07 PROCEDURE — 36415 COLL VENOUS BLD VENIPUNCTURE: CPT

## 2019-01-07 PROCEDURE — 74011250637 HC RX REV CODE- 250/637: Performed by: INTERNAL MEDICINE

## 2019-01-07 PROCEDURE — 70450 CT HEAD/BRAIN W/O DYE: CPT

## 2019-01-07 PROCEDURE — 77010033678 HC OXYGEN DAILY

## 2019-01-07 PROCEDURE — 80048 BASIC METABOLIC PNL TOTAL CA: CPT

## 2019-01-07 RX ADMIN — SERTRALINE 100 MG: 100 TABLET, FILM COATED ORAL at 08:20

## 2019-01-07 RX ADMIN — STANDARDIZED SENNA CONCENTRATE AND DOCUSATE SODIUM 2 TABLET: 8.6; 5 TABLET, FILM COATED ORAL at 08:20

## 2019-01-07 RX ADMIN — LEVOTHYROXINE SODIUM 100 MCG: 100 TABLET ORAL at 08:20

## 2019-01-07 RX ADMIN — LACTASE TAB 3000 UNIT 3000 UNITS: 3000 TAB at 17:07

## 2019-01-07 RX ADMIN — POTASSIUM CHLORIDE 20 MEQ: 20 TABLET, EXTENDED RELEASE ORAL at 08:21

## 2019-01-07 RX ADMIN — HYDROCHLOROTHIAZIDE: 12.5 CAPSULE ORAL at 08:20

## 2019-01-07 RX ADMIN — LEVETIRACETAM 500 MG: 500 TABLET ORAL at 08:21

## 2019-01-07 RX ADMIN — LACTASE TAB 3000 UNIT 3000 UNITS: 3000 TAB at 08:20

## 2019-01-07 RX ADMIN — Medication 10 ML: at 14:34

## 2019-01-07 RX ADMIN — ACETAMINOPHEN 1000 MG: 500 TABLET, FILM COATED ORAL at 20:41

## 2019-01-07 RX ADMIN — SIMVASTATIN 40 MG: 40 TABLET, FILM COATED ORAL at 20:41

## 2019-01-07 RX ADMIN — LORAZEPAM 1 MG: 1 TABLET ORAL at 20:42

## 2019-01-07 RX ADMIN — Medication 10 ML: at 20:42

## 2019-01-07 RX ADMIN — FUROSEMIDE 20 MG: 20 TABLET ORAL at 08:21

## 2019-01-07 RX ADMIN — LACTASE TAB 3000 UNIT 3000 UNITS: 3000 TAB at 11:33

## 2019-01-07 RX ADMIN — PANTOPRAZOLE SODIUM 40 MG: 40 TABLET, DELAYED RELEASE ORAL at 08:21

## 2019-01-07 RX ADMIN — LEVETIRACETAM 500 MG: 500 TABLET ORAL at 17:07

## 2019-01-07 RX ADMIN — Medication 5 ML: at 06:00

## 2019-01-07 NOTE — PROGRESS NOTES
01/07/19 1803 Dual Skin Pressure Injury Assessment Dual Skin Pressure Injury Assessment X Second Care Provider (Based on [facility]) Lorel Duverney, [address] [facility] Facial Bony Promineces Forehead 
(abrasion) Skin Integumentary Skin Integumentary (WDL) X Skin Condition/Temp Warm;Dry;Fragile Skin Color Appropriate for ethnicity; Ecchymosis (comment) Skin Integrity Abrasion (L temporal area ) Hair Growth Present Varicosities Absent Turgor Epidermis thin w/ loss of subcut tissue Pressure  Injury Documentation No Pressure Injury Noted-Pressure Ulcer Prevention Initiated

## 2019-01-07 NOTE — PROGRESS NOTES
Patient A/O x 4. NSR on the monitor, BP WDL. SBP maintains in goal range of <140. Respirations even and unlabored, O2 sat 96% on RA. RR 21. Lung sounds clear/diminished bilaterally. PERRL. Purposeful movement of all extremities.  R>L d/t recent surgery on left hand. No change to neuro status this shift or overnight, no deficits noted. Abdomen semi soft, intact, active bowel sounds. Good appetite. Patient continent, voiding clear, yellow urine. Patient turned on side for comfort. Will continue every hour neuro checks.

## 2019-01-07 NOTE — PROGRESS NOTES
A follow up visit was made to the patient. Emotional support, spiritual presence and  
prayer were provided. Peggy Navas

## 2019-01-07 NOTE — PROGRESS NOTES
Patient with no neuro changes throughout shift. Telephone orders from Dr. Gloria Garzon for transfer orders with remote tele.

## 2019-01-07 NOTE — CONSULTS
NEUROSURGERY CONSULT NOTE:     CC: Fall from standing height     HPI:   Nba Miller 80 y.o. female with a PMH of multiple medical co-morbidities who presents to [address] [place] [address] following a mechanical fall from standing height with imaging findings of a well circumscribed right frontal intraparenchymal hemorrhage measuring 2.2 cm x 2 cm without significant surrounding edema, and multiple small scattered contusion. She is currently resting in bed this morning in the ICU.      Past Medical History:   Diagnosis Date    Anxiety     managed with medication    Decreased mobility     using rollator and wheelchair     Edema     bilateral feet/ankles (takes lasix PRN)    GERD (gastroesophageal reflux disease)     managed with medication    [place] (hard of hearing)     does not wear hearing aids    Hx of echocardiogram 01/04/2017    EF 60.8%, mild aortic valve sclerosis, mild AR, mild MR    Hyperlipidemia     Hypertension     managed with medication    Hypothyroid     managed with medication    Osteoarthritis     Pneumonia 2003     Past Surgical History:   Procedure Laterality Date    HX HIP REPLACEMENT Right 2003    HX ORTHOPAEDIC Left     Hip replacement    HX OTHER SURGICAL  2013    lung tumor     Allergies   Allergen Reactions    Amoxicillin Rash    Penicillins Other (comments)     \"Mouth breaks out\"    Sulfa (Sulfonamide Antibiotics) Other (comments)     Social History     Socioeconomic History    Marital status:      Spouse name: Not on file    Number of children: Not on file    Years of education: Not on file    Highest education level: Not on file   Social Needs    Financial resource strain: Not on file    Food insecurity - worry: Not on file    Food insecurity - inability: Not on file   [employer] needs - medical: Not on file   [employer] needs - non-medical: Not on file   Occupational History    Not on file   Tobacco Use    Smoking status: Never Smoker    Smokeless tobacco: Never Used   Substance and Sexual Activity    Alcohol use: No    Drug use: No    Sexual activity: Not on file   Other Topics Concern    Not on file   Social History Narrative    Not on file     Review of Systems - Negative except as noted above     Physical Exam:   Visit Vitals  /65 (BP 1 Location: Left arm, BP Patient Position: At rest)   Pulse (!) 59   Temp 96.9 °F (36.1 °C)   Resp 19   Ht 5' 6\" (1.676 m)   Wt 132 lb 11.5 oz (60.2 kg)   SpO2 100%   BMI 21.42 kg/m²   GCS 15  General: No acute distress  Sleeping but arouse to loud voice as she is hard of hearing, alert, and oriented to person, place, time (thought it was 2018), and situation   Left frontal subgaleal hematoma   Eyes open spontaneously   PERRL, EOMI  Mild right lower facial asymmetry  No pronation or drift on exam   Patient with strength exam as follows: Age appropriate strength examination Patient move all extremities with symmetric and full strength  Gait Deferred secondary to fall risk     Assessment and Plan:   Bonifacio Dougherty 80 y.o. female who is now status post-fall from standing height. I have independently reviewed and interpreted CT Head WO contrast that demonstrates a well circumscribed right frontal intraparenchymal hemorrhage measuring 2.2 cm x 2 cm without significant surrounding edema, and multiple small scattered contusion. Repeat CT Head WO contrast this am demonstrates no significant interval change. Given the recent fall this hemorrhage is likely a traumatic hemorrhagic contusion. In a delayed fashion patient should undergo an MRI Brain WWO contrast to rule out hemorrhagic metastasis.  No acute neurosurgical intervention necessary at this time.  - No acute neurosurgical intervention necessary at this time.   - Recommend Strict Blood pressure control   - SBP < 140 mmHg   - No anti-coagulation or anti-platelet medications   - Recommend Keppra 500 mg BID for seizure prophylaxis for seven days   - MRI Brain WWO contrast to rule out hemorrhagic metastasis at her one month follow-up appointment  - Please call with questions or concerns    Tammy Daniels.  Cj Mcmullen, [facility]

## 2019-01-07 NOTE — PROGRESS NOTES
Bedside report received from Clotilde Caldwell, [facility]. Dual neuro assessment completed. No impairment noted. Pt is resting comfortably in bed. No needs voiced at this time. BP remains WDL off Cardene, will monitor.

## 2019-01-07 NOTE — PROGRESS NOTES
Interdisciplinary team rounds were held 1/7/2019 with the following team members:Care Management, Nursing, Nurse Practitioner, Nutrition, Pharmacy, Physician and Clinical Coordinator and the patient. Plan of care discussed. See clinical pathway and/or care plan for interventions and desired outcomes.

## 2019-01-07 NOTE — PROGRESS NOTES
TRANSFER - OUT REPORT: 
 
Verbal report given to Jaime Matthews on Bhavesh Platt  being transferred to 723(unit) for routine progression of care Report consisted of patients Situation, Background, Assessment and  
Recommendations(SBAR). Information from the following report(s) SBAR, Kardex, Intake/Output, MAR, Recent Results and Cardiac Rhythm NSR was reviewed with the receiving nurse. Opportunity for questions and clarification was provided. Patient transported with: 
 Monitor Registered Nurse Dual neuro/NIH performed at bedside upon transfer with Jaime Matthews

## 2019-01-07 NOTE — PROGRESS NOTES
Head to toe assessment completed on pt this PM. Pt is alert and oriented x 4. PERRLA. Able to follow commands. No headache reported. R greater than L  strength noted d/t surgery completed on L hand in December. Lungs clear upon auscultation. NSR - S1 and S2 auscultated. Bowel sounds active. Has not yet voided. Extremities warm and dry. Abrasion and bruising noted to L side of head. Scabbed over with dried blood. BLE edema - 3+ pitting noted. Blanchable redness to heels.

## 2019-01-08 ENCOUNTER — HOME HEALTH ADMISSION (OUTPATIENT)
Dept: HOME HEALTH SERVICES | Facility: HOME HEALTH | Age: 84
End: 2019-01-08
Payer: MEDICARE

## 2019-01-08 VITALS
TEMPERATURE: 97.6 F | OXYGEN SATURATION: 98 % | DIASTOLIC BLOOD PRESSURE: 78 MMHG | HEIGHT: 66 IN | WEIGHT: 132.72 LBS | BODY MASS INDEX: 21.33 KG/M2 | SYSTOLIC BLOOD PRESSURE: 137 MMHG | RESPIRATION RATE: 18 BRPM | HEART RATE: 69 BPM

## 2019-01-08 LAB
ANION GAP SERPL CALC-SCNC: 9 MMOL/L (ref 7–16)
BUN SERPL-MCNC: 13 MG/DL (ref 8–23)
CALCIUM SERPL-MCNC: 8.7 MG/DL (ref 8.3–10.4)
CHLORIDE SERPL-SCNC: 99 MMOL/L (ref 98–107)
CO2 SERPL-SCNC: 27 MMOL/L (ref 21–32)
CREAT SERPL-MCNC: 0.86 MG/DL (ref 0.6–1)
GLUCOSE SERPL-MCNC: 73 MG/DL (ref 65–100)
POTASSIUM SERPL-SCNC: 3.8 MMOL/L (ref 3.5–5.1)
SODIUM SERPL-SCNC: 135 MMOL/L (ref 136–145)

## 2019-01-08 PROCEDURE — 36415 COLL VENOUS BLD VENIPUNCTURE: CPT

## 2019-01-08 PROCEDURE — 97530 THERAPEUTIC ACTIVITIES: CPT

## 2019-01-08 PROCEDURE — 80048 BASIC METABOLIC PNL TOTAL CA: CPT

## 2019-01-08 PROCEDURE — 74011250637 HC RX REV CODE- 250/637: Performed by: INTERNAL MEDICINE

## 2019-01-08 PROCEDURE — 97161 PT EVAL LOW COMPLEX 20 MIN: CPT

## 2019-01-08 RX ORDER — LEVETIRACETAM 500 MG/1
500 TABLET ORAL 2 TIMES DAILY
Qty: 10 TAB | Refills: 0 | Status: SHIPPED | OUTPATIENT
Start: 2019-01-08 | End: 2019-01-13

## 2019-01-08 RX ADMIN — ACETAMINOPHEN 1000 MG: 500 TABLET, FILM COATED ORAL at 05:01

## 2019-01-08 RX ADMIN — PANTOPRAZOLE SODIUM 40 MG: 40 TABLET, DELAYED RELEASE ORAL at 05:01

## 2019-01-08 RX ADMIN — LEVOTHYROXINE SODIUM 100 MCG: 100 TABLET ORAL at 05:01

## 2019-01-08 RX ADMIN — SERTRALINE 100 MG: 100 TABLET, FILM COATED ORAL at 08:38

## 2019-01-08 RX ADMIN — POTASSIUM CHLORIDE 20 MEQ: 20 TABLET, EXTENDED RELEASE ORAL at 08:39

## 2019-01-08 RX ADMIN — LACTASE TAB 3000 UNIT 3000 UNITS: 3000 TAB at 09:02

## 2019-01-08 RX ADMIN — LEVETIRACETAM 500 MG: 500 TABLET ORAL at 08:39

## 2019-01-08 RX ADMIN — HYDROCHLOROTHIAZIDE: 12.5 CAPSULE ORAL at 08:39

## 2019-01-08 RX ADMIN — STANDARDIZED SENNA CONCENTRATE AND DOCUSATE SODIUM 2 TABLET: 8.6; 5 TABLET, FILM COATED ORAL at 08:38

## 2019-01-08 RX ADMIN — FUROSEMIDE 20 MG: 20 TABLET ORAL at 08:39

## 2019-01-08 RX ADMIN — Medication 10 ML: at 05:01

## 2019-01-08 NOTE — PROGRESS NOTES
Discharge instructions  all new medications,medication side effects sheet, follow up appointment and  prescriptions reviewed and explained to the patient and grandaughher. Patient  And grandaughterverbalizes understanding of instructions. A copy of discharge instructions and prescriptions  have been given to the grandaughter. Opportunity for questions provided.

## 2019-01-08 NOTE — PROGRESS NOTES
Problem: Falls - Risk of 
Goal: *Absence of Falls Document Nba Haney Fall Risk and appropriate interventions in the flowsheet. Outcome: Progressing Towards Goal 
Fall Risk Interventions: 
Mobility Interventions: Bed/chair exit alarm, OT consult for ADLs, Patient to call before getting OOB, PT Consult for mobility concerns, PT Consult for assist device competence, Strengthening exercises (ROM-active/passive), Utilize walker, cane, or other assistive device Medication Interventions: Assess postural VS orthostatic hypotension, Bed/chair exit alarm, Patient to call before getting OOB, Teach patient to arise slowly Elimination Interventions: Bed/chair exit alarm, Call light in reach, Patient to call for help with toileting needs, Toilet paper/wipes in reach History of Falls Interventions: Bed/chair exit alarm, Consult care management for discharge planning Problem: Pressure Injury - Risk of 
Goal: *Prevention of pressure injury Document Pavel Scale and appropriate interventions in the flowsheet. Outcome: Progressing Towards Goal 
Pressure Injury Interventions: 
Sensory Interventions: Assess changes in LOC, Keep linens dry and wrinkle-free Moisture Interventions: Absorbent underpads Activity Interventions: Increase time out of bed, Pressure redistribution bed/mattress(bed type), PT/OT evaluation Mobility Interventions: HOB 30 degrees or less, Pressure redistribution bed/mattress (bed type), PT/OT evaluation Nutrition Interventions: Document food/fluid/supplement intake Friction and Shear Interventions: Apply protective barrier, creams and emollients, Foam dressings/transparent film/skin sealants, HOB 30 degrees or less, Minimize layers

## 2019-01-08 NOTE — PROGRESS NOTES
Hospitalist Progress Note 2019 Admit Date: 2019  1:12 PM  
NAME: Jessica Latham :  1932 MRN:  [id number] Attending: Colton Cassidy MD 
PCP:  Nicolle Atwood MD 
 
SUBJECTIVE:  
Patient is a 86yoF admitted after a mechanical fall in her [place] parking lot with resultant R frontal intraparenchymal hemorrhage. Neurosurgery was consulted. Hospitalist Service admitted to ICU. Repeat CT head is stable. 19:  Patient feels fine this morning. She denies headache, nausea, vomting. Review of Systems negative with exception of pertinent positives noted above PHYSICAL EXAM  
 
Visit Vitals /71 (BP 1 Location: Left arm, BP Patient Position: At rest) Pulse 74 Temp 98.2 °F (36.8 °C) Resp 22 Ht 5' 6\" (1.676 m) Wt 60.2 kg (132 lb 11.5 oz) SpO2 98% BMI 21.42 kg/m² Temp (24hrs), Av.5 °F (36.4 °C), Min:96.8 °F (36 °C), Max:98.4 °F (36.9 °C) Oxygen Therapy O2 Sat (%): 98 % (19 1805) Pulse via Oximetry: 70 beats per minute (19 1735) O2 Device: Room air (19 1619) O2 Flow Rate (L/min): 2 l/min (19 0814) Intake/Output Summary (Last 24 hours) at 2019 193 Last data filed at 2019 1641 Gross per 24 hour Intake 1920 ml Output 2275 ml Net -355 ml General: No acute distress Head:  Ecchymosis of L forehead with small laceration   
Lungs:  CTA Bilaterally. Heart:  Regular rate and rhythm,  No murmur, rub, or gallop Abdomen: Soft, Non distended, Non tender, Positive bowel sounds Extremities: No cyanosis, clubbing or edema Neurologic:  No focal deficits ASSESSMENT Active Hospital Problems Diagnosis Date Noted  Fall 2019  Essential hypertension 2019  Acquired hypothyroidism 2019  Traumatic intracranial hemorrhage without loss of consciousness ([address] 2019  Decreased ambulation status 2015  Anxiety and depression 2015 Plan: Traumatic Intracranial Hemorrhage 
- Continue monitoring per protocol in ICU 
- Neurosurgery following 
- Continue Keppra for seizure prevention, per neurosurgery recs - Close control of BP to keep SBP <140 (BP stable this far) Hypothyroidism 
- Continue Synthroid HTN 
- Home meds - Watch BP closely per above. DVT Prophylaxis: scds DISPO:  Can move to floor this afternoon if remains stable. Signed By: Luca Maldonado MD   
 January 7, 2019

## 2019-01-08 NOTE — DISCHARGE INSTRUCTIONS
No aspirin or other NSAIDs (ibuprofen, naproxen, etc) or blood thinners until you follow up and cleared by neurosurgeon. Tylenol only for pain. Activity: No driving until cleared by physician                No strenuous activity                Fall prevention    Do not use alcohol    Take BP medications as directed if prescribed         Report to physician:              New onset or worsening headache              nausea or vomiting                visual changes or unequal pupil size              memory loss , confusion,  lethargy or  sudden loss of consciousness              Seizures              Change in gait, loss of balance, dizziness              weakness in arms and legs      Patient Education        Preventing Falls: Care Instructions  Your Care Instructions    Getting around your home safely can be a challenge if you have injuries or health problems that make it easy for you to fall. Loose rugs and furniture in walkways are among the dangers for many older people who have problems walking or who have poor eyesight. People who have conditions such as arthritis, osteoporosis, or dementia also have to be careful not to fall. You can make your home safer with a few simple measures. Follow-up care is a key part of your treatment and safety. Be sure to make and go to all appointments, and call your doctor if you are having problems. It's also a good idea to know your test results and keep a list of the medicines you take. How can you care for yourself at home? Taking care of yourself  · You may get dizzy if you do not drink enough water. To prevent dehydration, drink plenty of fluids, enough so that your urine is light yellow or clear like water. Choose water and other caffeine-free clear liquids. If you have kidney, heart, or liver disease and have to limit fluids, talk with your doctor before you increase the amount of fluids you drink.   · Exercise regularly to improve your strength, muscle tone, and balance. Walk if you can. Swimming may be a good choice if you cannot walk easily. · Have your vision and hearing checked each year or any time you notice a change. If you have trouble seeing and hearing, you might not be able to avoid objects and could lose your balance. · Know the side effects of the medicines you take. Ask your doctor or pharmacist whether the medicines you take can affect your balance. Sleeping pills or sedatives can affect your balance. · Limit the amount of alcohol you drink. Alcohol can impair your balance and other senses. · Ask your doctor whether calluses or corns on your feet need to be removed. If you wear loose-fitting shoes because of calluses or corns, you can lose your balance and fall. · Talk to your doctor if you have numbness in your feet. Preventing falls at home  · Remove raised doorway thresholds, throw rugs, and clutter. Repair loose carpet or raised areas in the floor. · Move furniture and electrical cords to keep them out of walking paths. · Use nonskid floor wax, and wipe up spills right away, especially on ceramic tile floors. · If you use a walker or cane, put rubber tips on it. If you use crutches, clean the bottoms of them regularly with an abrasive pad, such as steel wool. · Keep your house well lit, especially [place], and outside walkways. Use night-lights in areas such as hallways and bathrooms. Add extra light switches or use remote switches (such as switches that go on or off when you clap your hands) to make it easier to turn lights on if you have to get up during the night. · Install sturdy handrails on stairways. · Move items in your cabinets so that the things you use a lot are on the lower shelves (about waist level). · Keep a cordless phone and a flashlight with new batteries by your bed. If possible, put a phone in each of the main rooms of your house, or carry a cell phone in case you fall and cannot reach a phone.  Or, you can wear a device around your neck or wrist. You push a button that sends a signal for help. · Wear low-heeled shoes that fit well and give your feet good support. Use footwear with nonskid soles. Check the heels and soles of your shoes for wear. Repair or replace worn heels or soles. · Do not wear socks without shoes on wood floors. · Walk on the grass when the sidewalks are slippery. If you live in an area that gets snow and ice in the winter, sprinkle salt on slippery steps and sidewalks. Preventing falls in the bath  · Install grab bars and nonskid mats inside and outside your shower or tub and near the toilet and sinks. · Use shower chairs and bath benches. · Use a hand-held shower head that will allow you to sit while showering. · Get into a tub or shower by putting the weaker leg in first. Get out of a tub or shower with your strong side first.  · Repair loose toilet seats and consider installing a raised toilet seat to make getting on and off the toilet easier. · Keep your bathroom door unlocked while you are in the shower. Where can you learn more? Go to http://jonatan-carlo.info/. Enter [phone] [id number] [phone] in the search box to learn more about \"Preventing Falls: Care Instructions. \"  Current as of: March 16, 2018  Content Version: 11.8  © 6514-9466 [employer]. Care instructions adapted under license by [employer] (which disclaims liability or warranty for this information). If you have questions about a medical condition or this instruction, always ask your healthcare professional. [place] [address] any warranty or liability for your use of this information.              DISCHARGE SUMMARY from Nurse    PATIENT INSTRUCTIONS:    After general anesthesia or intravenous sedation, for 24 hours or while taking prescription Narcotics:  · Limit your activities  · Do not drive and operate hazardous machinery  · Do not make important personal or business decisions  · Do not drink alcoholic beverages  · If you have not urinated within 8 hours after discharge, please contact your surgeon on call. Report the following to your surgeon:  · Excessive pain, swelling, redness or odor of or around the surgical area  · Temperature over 100.5  · Nausea and vomiting lasting longer than 4 hours or if unable to take medications  · Any signs of decreased circulation or nerve impairment to extremity: change in color, persistent  numbness, tingling, coldness or increase pain  · Any questions    What to do at Home:  Recommended activity: Activity as tolerated and no driving     If you experience any of the following symptoms see discharg einstructions, please follow up with primary care. *  Please give a list of your current medications to your Primary Care Provider. *  Please update this list whenever your medications are discontinued, doses are      changed, or new medications (including over-the-counter products) are added. *  Please carry medication information at all times in case of emergency situations. These are general instructions for a healthy lifestyle:    No smoking/ No tobacco products/ Avoid exposure to second hand smoke  Surgeon General's Warning:  Quitting smoking now greatly reduces serious risk to your health. Obesity, smoking, and sedentary lifestyle greatly increases your risk for illness    A healthy diet, regular physical exercise & weight monitoring are important for maintaining a healthy lifestyle    You may be retaining fluid if you have a history of heart failure or if you experience any of the following symptoms:  Weight gain of 3 pounds or more overnight or 5 pounds in a week, increased swelling in our hands or feet or shortness of breath while lying flat in bed. Please call your doctor as soon as you notice any of these symptoms; do not wait until your next office visit.     Recognize signs and symptoms of STROKE:    F-face looks uneven    A-arms unable to move or move unevenly    S-speech slurred or non-existent    T-time-call 911 as soon as signs and symptoms begin-DO NOT go       Back to bed or wait to see if you get better-TIME IS BRAIN. Warning Signs of HEART ATTACK     Call 911 if you have these symptoms:   Chest discomfort. Most heart attacks involve discomfort in the center of the chest that lasts more than a few minutes, or that goes away and comes back. It can feel like uncomfortable pressure, squeezing, fullness, or pain.  Discomfort in other areas of the upper body. Symptoms can include pain or discomfort in one or both arms, the back, neck, jaw, or stomach.  Shortness of breath with or without chest discomfort.  Other signs may include breaking out in a cold sweat, nausea, or lightheadedness. Don't wait more than five minutes to call 911 - MINUTES MATTER! Fast action can save your life. Calling 911 is almost always the fastest way to get lifesaving treatment. Emergency Medical Services staff can begin treatment when they arrive -- up to an hour sooner than if someone gets to the hospital by car. The discharge information has been reviewed with the patient. The patient verbalized understanding. Discharge medications reviewed with the patient and appropriate educational materials and side effects teaching were provided.   ___________________________________________________________________________________________________________________________________

## 2019-01-08 NOTE — PROGRESS NOTES
Problem: Mobility Impaired (Adult and Pediatric) Goal: *Acute Goals and Plan of Care (Insert Text) ST. Patient will perform bed mobility with SUPERVISION within 3 days. 2. Patient will transfer bed to chair with CONTACT GUARD ASSISTANCE within 3 days. 3. Patient will ambulate 50+ using least restrictive assistive device and MINIMAL ASSISTANCE within 3 days. 4. Patient will tolerate 15+ minutes of therapeutic activity/exercise and/or neuromuscular re-education while maintaining stable vitals to improve functional strength and activity tolerance within 3 days. LT. Patient will perform bed mobility with INDEPENDENCE within 7 days. 2. Patient will transfer bed to chair with STAND BY ASSISTANCE within 7 days. 3. Patient will demonstrate FAIR+ DYNAMIC STANDING balance within 7 day(s). 4. Patient will ambulate 150+ using least restrictive assistive device and CONTACT GUARD ASSISTANCE within 7 days. 5. Patient will tolerate 25+ minutes of therapeutic activity/exercise and/or neuromuscular re-education while maintaining stable vitals to improve functional strength and activity tolerance within 7 days. PHYSICAL THERAPY: Initial Assessment, Treatment Day: Day of Assessment, AM 2019INPATIENT: Hospital Day: 3 [place] / Product Type: Medicare /  
  
NAME/AGE/GENDER: Josue Patiño is a 80 y.o. female PRIMARY DIAGNOSIS: Intraparenchymal hemorrhage of brain (HCC) Traumatic intracranial hemorrhage without loss of consciousness (HCC) Traumatic intracranial hemorrhage without loss of consciousness ([facility] [address] ICD-10: Treatment Diagnosis:  
 · Generalized Muscle Weakness (M62.81) · Difficulty in walking, Not elsewhere classified (R26.2) Precaution/Allergies: 
Amoxicillin; Penicillins; and Sulfa (sulfonamide antibiotics) ASSESSMENT:  
Ms. Juan Manuel Yoo is a 80year old female admitted s/p fall with right frontal intraparenchymal hemmorrhage. Patient seen this AM for initial physical therapy evaluation: presents supine in bed, oriented x3, mild confusion throughout, and endorses 0/10 pain. Patient lives with her eldery spouse in a single story residence with 1 steps to enter. At baseline, patient is modified independent with ADLs, ambulates with a rollator walker, and endorses recent hip replacement. Today, B UE/LE strength 4-/5 and symmetrical, B LE ROM WFL, and sensation is grossly intact to light touch B UE/LE with the exception of C6 of L UE (baseline). Visual tracking and acuity WFL. Patient performed bed mobility with CGA/additional time/and moderate cueing, transfers with minimal assistance x1, and ambulation with 2x5ft with RW and minimal assistance x1. Demonstrated a slow, step-to gait pattern with functional weakness in B LEs and mild LE/trunk flexion. Overall fair to fair- dynamic balance. Ms. Michelle Gonzalez presents with decreased functional mobility and balance/gait status from baseline. Recommend continued skilled PT services to address stated deficits. Will follow and progress toward stated goals during acute stay. This section established at most recent assessment PROBLEM LIST (Impairments causing functional limitations): 1. Decreased Strength 2. Decreased ADL/Functional Activities 3. Decreased Transfer Abilities 4. Decreased Ambulation Ability/Technique 5. Decreased Balance 6. Decreased Activity Tolerance 7. Decreased Flexibility/Joint Mobility 8. Decreased Knowledge of Precautions INTERVENTIONS PLANNED: (Benefits and precautions of physical therapy have been discussed with the patient.) 1. Balance Exercise 2. Bed Mobility 3. Family Education 4. Gait Training 5. Group Therapy 6. Home Exercise Program (HEP) 7. Range of Motion (ROM) 8. Therapeutic Activites 9. Therapeutic Exercise/Strengthening 10. Transfer Training TREATMENT PLAN: Frequency/Duration: 3 times a week for duration of hospital stay Rehabilitation Potential For Stated Goals: Good RECOMMENDED REHABILITATION/EQUIPMENT: (at time of discharge pending progress): Due to the probability of continued deficits (see above) this patient will likely need continued skilled physical therapy after discharge. Equipment:  
? None at this time HISTORY:  
History of Present Injury/Illness (Reason for Referral): Difficulty in walking; Generalized weakness Past Medical History/Comorbidities:  
Ms. Khoa Avila  has a past medical history of Anxiety, Decreased mobility, Edema, GERD (gastroesophageal reflux disease), Elem (hard of hearing), echocardiogram, Hyperlipidemia, Hypertension, Hypothyroid, Osteoarthritis, and Pneumonia. Ms. Khoa Avila  has a past surgical history that includes hx other surgical (2013); hx hip replacement (Right, 2003); hx orthopaedic (Left); HAND CARPAL TUNNEL RELEASE LEFT (Left, 11/28/2018); and LEFT HIP ARTHROPLASTY TOTAL (Left, 10/8/2018). Social History/Living Environment:  
Home Environment: Private residence One/Two Story Residence: One story Living Alone: No 
Support Systems: Spouse/Significant Other/Partner, Family member(s) Patient Expects to be Discharged to[de-identified] Private residence Current DME Used/Available at Home: Cane, straight, Walker, rolling Prior Level of Function/Work/Activity: 
Patient lives with her eldery spouse in a single story residence with 1 steps to enter. At baseline, patient is modified independent with ADLs, ambulates with a rollator walker, and endorses recent hip replacement. Number of Personal Factors/Comorbidities that affect the Plan of Care: 0: LOW COMPLEXITY EXAMINATION:  
Most Recent Physical Functioning:  
Gross Assessment: 
  
         
  
Posture: 
  
Balance: 
Sitting: Impaired Sitting - Static: Good (unsupported) Sitting - Dynamic: Fair (occasional) Standing: Impaired Standing - Static: Fair Standing - Dynamic : Fair Bed Mobility: Supine to Sit: Contact guard assistance Scooting: Minimum assistance; Additional time Wheelchair Mobility: 
  
Transfers: 
Sit to Stand: Minimum assistance Stand to Sit: Minimum assistance Bed to Chair: Minimum assistance Gait: 
  
   
  
Body Structures Involved: 1. Nerves 2. Muscles Body Functions Affected: 1. Neuromusculoskeletal 
2. Movement Related Activities and Participation Affected: 1. Mobility 2. Self Care Number of elements that affect the Plan of Care: 4+: HIGH COMPLEXITY CLINICAL PRESENTATION:  
Presentation: Stable and uncomplicated: LOW COMPLEXITY CLINICAL DECISION MAKIN[address] [place] [address] AM-PAC 6 Clicks Basic Mobility Inpatient Short Form How much difficulty does the patient currently have. .. Unable A Lot A Little None 1. Turning over in bed (including adjusting bedclothes, sheets and blankets)? [] 1   [] 2   [] 3   [x] 4  
2. Sitting down on and standing up from a chair with arms ( e.g., wheelchair, bedside commode, etc.)   [] 1   [] 2   [x] 3   [] 4  
3. Moving from lying on back to sitting on the side of the bed? [] 1   [] 2   [x] 3   [] 4 How much help from another person does the patient currently need. .. Total A Lot A Little None 4. Moving to and from a bed to a chair (including a wheelchair)? [] 1   [] 2   [x] 3   [] 4  
5. Need to walk in hospital room? [] 1   [] 2   [x] 3   [] 4  
6. Climbing 3-5 steps with a railing? [] 1   [x] 2   [] 3   [] 4  
© , Trustees of [address] [place] [address], under license to [employer]. All rights reserved Score:  Initial: 18 Most Recent: 18 (Date: 19 ) Interpretation of Tool:  Represents activities that are increasingly more difficult (i.e. Bed mobility, Transfers, Gait). Score 24 23 22-20 19-15 14-10 9-7 6 Modifier CH CI CJ CK CL CM CN   
 
? Mobility - Walking and Moving Around:  
  - CURRENT STATUS: CK - 40%-59% impaired, limited or restricted  - GOAL STATUS: CJ - 20%-39% impaired, limited or restricted  - D/C STATUS:  ---------------To be determined--------------- [place] / Product Type: Medicare /   
 
Medical Necessity:    
· Patient demonstrates good rehab potential due to higher previous functional level. Reason for Services/Other Comments: 
· Patient continues to require skilled intervention due to decreased functional mobility and balance/gait status from baseline. Ruben Singh Use of outcome tool(s) and clinical judgement create a POC that gives a: Clear prediction of patient's progress: LOW COMPLEXITY  
  
 
 
 
TREATMENT:  
(In addition to Assessment/Re-Assessment sessions the following treatments were rendered) Pre-treatment Symptoms/Complaints:   
Pain: Initial:  
Pain Intensity 1: 0  Post Session:  0/10 Therapeutic Activity: (    25 Minutes): Therapeutic activities including bed mobility, transfer training, balance training, safety awareness training, ambulation on level ground 2x5ft, and patient education to improve mobility, strength, balance and coordination. Required minimal   to promote static and dynamic balance in standing. Braces/Orthotics/Lines/Etc:  
· O2 Device: Room air Treatment/Session Assessment:   
· Response to Treatment:  See above. · Interdisciplinary Collaboration:  
o Physical Therapist 
o Registered Nurse · After treatment position/precautions:  
o Up in chair 
o Call light within reach 
o RN notified 
o Family at bedside · Compliance with Program/Exercises: Will assess as treatment progresses · Recommendations/Intent for next treatment session: \"Next visit will focus on advancements to more challenging activities and reduction in assistance provided\". Total Treatment Duration: PT Patient Time In/Time Out Time In: 0935 Time Out: 1013 María Solomon DPT

## 2019-01-08 NOTE — PROGRESS NOTES
[facility] Face to Face Encounter Patients Name: Faviola Arshad    YOB: 1932 Ordering Physician: Dr Diego Miller Primary Diagnosis: Intraparenchymal hemorrhage of brain (HCC) Date of Face to Face:   1/8/2019 Face to Face Encounter findings are related to primary reason for home care:   yes. 1. I certify that the patient needs intermittent care as follows: physical therapy: strengthening, stretching/ROM, transfer training, gait/stair training, balance training and pt/caregiver education 2. I certify that this patient is homebound, that is: 1) patient requires the use of a cane and walker device, special transportation, or assistance of another to leave the home; or 2) patient's condition makes leaving the home medically contraindicated; and 3) patient has a normal inability to leave the home and leaving the home requires considerable and taxing effort. Patient may leave the home for infrequent and short duration for medical reasons, and occasional absences for non-medical reasons. Homebound status is due to the following functional limitations: Patient with strength deficits limiting the performance of all ADL's without caregiver assistance or the use of an assistive device. Patient with poor safety awareness and is at risk for falls without assistance of another person and the use of an assistive device. Patient with poor ambulation endurance limiting their safe ability to ascend/descend the required number of steps to leave the home. 3. I certify that this patient is under my care and that I, or a nurse practitioner or  [id number], or clinical nurse specialist, or certified nurse midwife, working with me, had a Face-to-Face Encounter that meets the physician Face-to-Face Encounter requirements.   The following are the clinical findings from the [address] [place] [address] encounter that support the need for skilled services and is a summary of the encounter:  
 
See hospital chart Ruben Verduzco RN 
1/8/2019 THE FOLLOWING TO BE COMPLETED BY THE COMMUNITY PHYSICIAN: 
 
I concur with the findings described above from the F2F encounter that this patient is homebound and in need of a skilled service. Certifying Physician: _____________________________________ Printed Certifying Physician Name: _____________________________________ Date: _________________

## 2019-01-08 NOTE — PROGRESS NOTES
Hospitalist Progress Note 2019 Admit Date: 2019  1:12 PM  
NAME: Ld Krishna :  1932 MRN:  [id number] Attending: Mirna Mcgee MD 
PCP:  Devika Owens MD 
 
SUBJECTIVE:  
Patient is an 79 yo F admitted  with traumatic R frontal intraparenchymal hemorrhage from mechanical fall. She was in ICU for 24 hours. Blood pressure remained under excellent control and repeat CT head stable. She denies headache and feels 'good.'  Seen with her granddaughter, Luca Hansen, at bedside. Review of Systems negative with exception of pertinent positives noted above PHYSICAL EXAM  
 
Visit Vitals /78 (BP 1 Location: Left arm, BP Patient Position: At rest) Pulse 69 Temp 97.6 °F (36.4 °C) Resp 18 Ht 5' 6\" (1.676 m) Wt 60.2 kg (132 lb 11.5 oz) SpO2 98% BMI 21.42 kg/m² Temp (24hrs), Av.7 °F (36.5 °C), Min:96.8 °F (36 °C), Max:98.4 °F (36.9 °C) Patient Vitals for the past 24 hrs: 
 Temp Pulse Resp BP SpO2  
19 0914 97.6 °F (36.4 °C) 69 18 137/78 98 % 19 0400 97.6 °F (36.4 °C) 65 16 121/69 97 % 19 0000 97.4 °F (36.3 °C) 78 17 111/62 96 % 19 2000 97.7 °F (36.5 °C) 74 20 123/73 97 % 19 1805 98.2 °F (36.8 °C) 74 22 115/71 98 % 19 1735  71 24  97 % 19 1734  75 22  97 % 19 1733  76 21  98 % 19 1731  77 24 123/76 97 % 19 1730  79 21  97 % 19 1722  75 18  96 % 19 1713  69 24  97 % 19 1710  69 20  97 % 19 1709  69 25  97 % 19 1704  73 16  97 % 19 1703  70 22  97 % 19 1702  75 25  96 % 19 1701    116/66 98 % 19 1630  65 17 110/62 95 % 19 1629  66 16  97 % 19 1619 98.4 °F (36.9 °C) 68 (!) 31 108/58 98 % 19 1600    108/58 97 % 19 1556  67 20  94 % 19 1555  67 26  94 % 19 1530  71 19 111/60 98 % 19 1529  72 22  98 % 01/07/19 1515  69 23  98 % 01/07/19 1510  69 23  98 % 01/07/19 1508  70 21  98 % 01/07/19 1502  68 23 125/63 99 % 01/07/19 1455  69 23  98 % 01/07/19 1454  69 19  98 % 01/07/19 1446  69 30  97 % 01/07/19 1442  68 26  98 % 01/07/19 1438  68 23  98 % 01/07/19 1431    105/56 97 % 01/07/19 1428  73 23  96 % 01/07/19 1401  74 19  97 % 01/07/19 1400  72 26 98/54 97 % 01/07/19 1342  66 18  97 % 01/07/19 1336  74 15  97 % 01/07/19 1335  79 21  99 % 01/07/19 1333  67 17  97 % 01/07/19 1332  69 16  97 % 01/07/19 1331  71 20 96/60 98 % 01/07/19 1323  67 18  97 % 01/07/19 1300    98/56 98 % 01/07/19 1259  66 16  99 % 01/07/19 1258  65 23  99 % 01/07/19 1232  68 25 110/58 96 % 01/07/19 1210  75 20  97 % 01/07/19 1201    102/52 97 % 01/07/19 1200  70 21  97 % 01/07/19 1130  61 25  96 % 01/07/19 1115  62 17  96 % 01/07/19 1114 96.8 °F (36 °C) 71 (!) 31 113/59 97 % 01/07/19 1108  67 16  95 % 01/07/19 1100  61 23 113/59 97 % 01/07/19 1047  68 16  98 % 01/07/19 1046  64 23  95 % 01/07/19 1045  64 15  98 % 01/07/19 1044  64 13  96 % 01/07/19 1043  62 20  96 % 01/07/19 1042  64 18  94 % 01/07/19 1041  66 18  98 % 01/07/19 1037  69 17  98 % 01/07/19 1036  70 24  98 % 01/07/19 1035  71 23  98 % 01/07/19 1034  73 22  98 % 01/07/19 1031    116/67 95 % 01/07/19 1030  63 15  97 % 01/07/19 1029  66 20  98 % 01/07/19 1015    (!) 87/54 94 % Oxygen Therapy O2 Sat (%): 98 % (01/08/19 0914) Pulse via Oximetry: 70 beats per minute (01/07/19 1735) O2 Device: Room air (01/07/19 1619) O2 Flow Rate (L/min): 2 l/min (01/07/19 0814) Intake/Output Summary (Last 24 hours) at 1/8/2019 6933 Last data filed at 1/7/2019 1641 Gross per 24 hour Intake 1200 ml Output 1300 ml Net -100 ml General: No acute distress, elderly, bruise noted on L forehead and L side of neck   
Lungs:  CTA Bilaterally. Heart:  Regular rate and rhythm,  No murmur, rub, or gallop Abdomen: Soft, Non distended, Non tender, Positive bowel sounds Extremities: No cyanosis, clubbing or edema Neurologic:  No focal deficits Recent Results (from the past 24 hour(s)) PLEASE READ & DOCUMENT PPD TEST IN 24 HRS Collection Time: 01/07/19  7:18 PM  
Result Value Ref Range PPD neg Negative  
 mm Induration 0 mm METABOLIC PANEL, BASIC Collection Time: 01/08/19  5:14 AM  
Result Value Ref Range Sodium 135 (L) 136 - 145 mmol/L Potassium 3.8 3.5 - 5.1 mmol/L Chloride 99 98 - 107 mmol/L  
 CO2 27 21 - 32 mmol/L Anion gap 9 7 - 16 mmol/L Glucose 73 65 - 100 mg/dL BUN 13 8 - 23 MG/DL Creatinine 0.86 0.6 - 1.0 MG/DL  
 GFR est AA >60 >60 ml/min/1.73m2 GFR est non-AA >60 >60 ml/min/1.73m2 Calcium 8.7 8.3 - 10.4 MG/DL Imaging:   
CT HEAD WO CONT Final Result IMPRESSION:  
  
No interval change. Date of Dictation: 1/7/2019 4:42 AM  
  
  
CT HEAD WO CONT Final Result IMPRESSION: 1. LARGE LEFT FRONTAL SCALP HEMATOMA WITHOUT UNDERLYING CALVARIAL FRACTURE. 2.  MULTIPLE SMALL HIGH-ATTENUATION LESIONS NEAR THE WAGNER-WHITE MATTER JUNCTION  
IN THE RIGHT CEREBRAL HEMISPHERE COULD CONCEIVABLY REPRESENT CONTRECOUP  
HEMORRHAGIC CONTUSIONS BUT MUST BE CONSIDERED SUSPICIOUS FOR HEMORRHAGIC OR  
MELANOMA METASTASES UNTIL PROVEN OTHERWISE THERE IS 80YEAR-OLD. 3.  ATROPHY WITH SMALL VESSEL ISCHEMIC DISEASE. THIS CASE WAS REVIEWED IN CONSULTATION WITH COLLEAGUES. ASSESSMENT Hospital Problems as of 1/8/2019 Date Reviewed: 11/27/2018 Codes Class Noted - Resolved POA * (Principal) Traumatic intracranial hemorrhage without loss of consciousness ([facility] [address] ICD-10-CM: S06.300A ICD-9-CM: 853.01  1/6/2019 - Present Yes Fall ICD-10-CM: W19. Elizabeth Farr ICD-9-CM: E888.9  1/6/2019 - Present Yes Essential hypertension ICD-10-CM: I10 
ICD-9-CM: 401.9  1/6/2019 - Present Yes Acquired hypothyroidism ICD-10-CM: E03.9 ICD-9-CM: 244.9  1/6/2019 - Present Yes Decreased ambulation status ICD-10-CM: Z74.09 
ICD-9-CM: 780.99  11/18/2015 - Present Yes Anxiety and depression ICD-10-CM: F41.9, F32.9 ICD-9-CM: 300.00, 311  6/30/2015 - Present Yes Plan: · Traumatic intraparenchymal hemorrhage- no blood thinners or aspirin. · Get PT evaluation · HTN- well controlled- continue treatment · Hypothyroidism · Continue synthroid Dispo- hopefully home today DVT Prophylaxis: SCDs Signed By: Marian Bernal MD   
 January 8, 2019

## 2019-01-08 NOTE — PROGRESS NOTES
TRANSFER - IN REPORT: 
 
Verbal report received from SORAYA Carreon on Faviola Arshad  being received from ICU for routine progression of care Report consisted of patients Situation, Background, Assessment and  
Recommendations(SBAR). Information from the following report(s) SBAR, Kardex, ED Summary, Procedure Summary, Intake/Output, MAR and Recent Results was reviewed with the receiving nurse. Opportunity for questions and clarification was provided. Assessment completed upon patients arrival to unit and care assumed.

## 2019-01-08 NOTE — DISCHARGE SUMMARY
Hospitalist Discharge Summary     Patient ID:  Darnell Stanton  [id number]  55 y.o.  11/11/1932  Admit date: 1/6/2019  1:12 PM  Discharge date and time: 1/8/2019  Attending: Tavares Deshpande MD  PCP:  Doug Mccormick MD  Treatment Team: Attending Provider: Tavares Deshpande MD; Consulting Provider: Tori Rhodes MD; Care Manager: Jacklyn Win RN    Principal Diagnosis Traumatic intracranial hemorrhage without loss of consciousness [facility])   Hospital Problems as of 1/8/2019 Date Reviewed: 11/27/2018          Codes Class Noted - Resolved POA    * (Principal) Traumatic intracranial hemorrhage without loss of consciousness ([facility] [place] [facility] ICD-10-CM: S06.300A  ICD-9-CM: 853.01  1/6/2019 - Present Yes        Fall ICD-10-CM: W19. Svetlana Adamant  ICD-9-CM: E888.9  1/6/2019 - Present Yes        Essential hypertension ICD-10-CM: I10  ICD-9-CM: 401.9  1/6/2019 - Present Yes        Acquired hypothyroidism ICD-10-CM: E03.9  ICD-9-CM: 244.9  1/6/2019 - Present Yes        Decreased ambulation status ICD-10-CM: Z74.09  ICD-9-CM: 780.99  11/18/2015 - Present Yes        Anxiety and depression ICD-10-CM: F41.9, F32.9  ICD-9-CM: 300.00, 311  6/30/2015 - Present Yes                 HPI:  Silvestre Carver is an 79 yo F with history of depression/anxiety, leg edema, limited mobility and recent L hip arthroplasty on 10/10/18, who presents to the ED after falling in her [employer] parking lot earlier today. She states she was ambulating with a cane and reached out to grab her 's arm for stabilization and fell to the left. She hit the front of her L head. Denies loss of consciousness. Currently, she is alert and oriented x 4, though she is extremely hard of hearing and tangential in conversation. She denies severe headache, but reports some pain over abrasion/bruise to L forehead. In the ED, CT head consistent with 2.2 cm x 2 cm R frontal intraparenchymal hemorrhage (per neurosurgeon's read of CT).      Granddaughter, Olvin Bullard, came towards end of evaluation. States that there was some recent confusion over her meds and she stopped taking levothyroxine and sertraline. Mrs. Kristi Limon was under the impression that Dr. Maria Fernanda Sullivan told her to stop, but this does not appear to be the case (muna confusion due to difficulty hearing and Susan Mathias did not go to last visit with her like she normally does).   Hospitalist service asked to admit for intraparenchymal bleed (R frontal). '      Hospital Course:  She was admitted to ICU for close observation. Her blood pressure remained controlled. Repeat CT head without progression of R frontal bleed. She remained alert and oriented x 4. Worked with PT and felt to benefit from continued PT on discharge- home PT will be arranged. She was counseled to avoid aspirin, other NSAIDs, and blood thinners until cleared by neurosurgeon on 4 week follow up. Significant Diagnostic Studies:       Labs: Results:       Chemistry Recent Labs     01/08/19  0514 01/07/19  0425 01/06/19  1357   GLU 73 72 88   * 134* 133*   K 3.8 3.4* 3.7   CL 99 100 98   CO2 27 30 28   BUN 13 14 16   CREA 0.86 0.82 0.91   CA 8.7 8.2* 8.8   AGAP 9 4* 7      CBC w/Diff Recent Labs     01/06/19  1357   WBC 7.7   RBC 3.26*   HGB 9.9*   HCT 30.4*      GRANS 71   LYMPH 18   EOS 1      Cardiac Enzymes No results for input(s): CPK, CKND1, TERRELL in the last 72 hours. No lab exists for component: CKRMB, TROIP   Coagulation Recent Labs     01/06/19  1357   PTP 13.2   INR 1.0       Lipid Panel Lab Results   Component Value Date/Time    Cholesterol, total 137 11/01/2018 10:37 AM    HDL Cholesterol 60 11/01/2018 10:37 AM    LDL, calculated 60 11/01/2018 10:37 AM    VLDL, calculated 17 11/01/2018 10:37 AM    Triglyceride 85 11/01/2018 10:37 AM      BNP No results for input(s): BNPP in the last 72 hours. Liver Enzymes No results for input(s): TP, ALB, TBIL, AP, SGOT, GPT in the last 72 hours.     No lab exists for component: DBIL   Thyroid Studies Lab Results   Component Value Date/Time    TSH 2.880 11/01/2018 10:37 AM            Discharge Exam:  Visit Vitals  /78 (BP 1 Location: Left arm, BP Patient Position: At rest)   Pulse 69   Temp 97.6 °F (36.4 °C)   Resp 18   Ht 5' 6\" (1.676 m)   Wt 60.2 kg (132 lb 11.5 oz)   SpO2 98%   BMI 21.42 kg/m²     Patient Vitals for the past 24 hrs:   Temp Pulse Resp BP SpO2   01/08/19 0914 97.6 °F (36.4 °C) 69 18 137/78 98 %   01/08/19 0400 97.6 °F (36.4 °C) 65 16 121/69 97 %   01/08/19 0000 97.4 °F (36.3 °C) 78 17 111/62 96 %   01/07/19 2000 97.7 °F (36.5 °C) 74 20 123/73 97 %   01/07/19 1805 98.2 °F (36.8 °C) 74 22 115/71 98 %   01/07/19 1735  71 24  97 %   01/07/19 1734  75 22  97 %   01/07/19 1733  76 21  98 %   01/07/19 1731  77 24 123/76 97 %   01/07/19 1730  79 21  97 %   01/07/19 1722  75 18  96 %   01/07/19 1713  69 24  97 %   01/07/19 1710  69 20  97 %   01/07/19 1709  69 25  97 %   01/07/19 1704  73 16  97 %   01/07/19 1703  70 22  97 %   01/07/19 1702  75 25  96 %   01/07/19 1701    116/66 98 %   01/07/19 1630  65 17 110/62 95 %   01/07/19 1629  66 16  97 %   01/07/19 1619 98.4 °F (36.9 °C) 68 (!) 31 108/58 98 %   01/07/19 1600    108/58 97 %   01/07/19 1556  67 20  94 %   01/07/19 1555  67 26  94 %   01/07/19 1530  71 19 111/60 98 %   01/07/19 1529  72 22  98 %   01/07/19 1515  69 23  98 %   01/07/19 1510  69 23  98 %   01/07/19 1508  70 21  98 %   01/07/19 1502  68 23 125/63 99 %   01/07/19 1455  69 23  98 %   01/07/19 1454  69 19  98 %   01/07/19 1446  69 30  97 %   01/07/19 1442  68 26  98 %   01/07/19 1438  68 23  98 %   01/07/19 1431    105/56 97 %   01/07/19 1428  73 23  96 %   01/07/19 1401  74 19  97 %   01/07/19 1400  72 26 98/54 97 %   01/07/19 1342  66 18  97 %   01/07/19 1336  74 15  97 %   01/07/19 1335  79 21  99 %   01/07/19 1333  67 17  97 %   01/07/19 1332  69 16  97 %   01/07/19 1331  71 20 96/60 98 %   01/07/19 1323  67 18  97 %   01/07/19 1300    98/56 98 %   01/07/19 1259  66 16  99 %   01/07/19 1258  65 23  99 %   01/07/19 1232  68 25 110/58 96 %       General appearance: alert, cooperative, no distress, appears stated age, contusion to L forehead  Lungs: clear to auscultation bilaterally  Heart: regular rate and rhythm, S1, S2 normal, no murmur, click, rub or gallop  Abdomen: soft, non-tender. Bowel sounds normal. No masses,  no organomegaly  Extremities: no cyanosis or edema  Neurologic: Grossly normal    Disposition: home  Discharge Condition: stable  Patient Instructions:   Current Discharge Medication List      START taking these medications    Details   levETIRAcetam (KEPPRA) 500 mg tablet Take 1 Tab by mouth two (2) times a day for 5 days. Qty: 10 Tab, Refills: 0         CONTINUE these medications which have NOT CHANGED    Details   LORazepam (ATIVAN) 1 mg tablet Take 1 Tab by mouth daily as needed for Anxiety. Qty: 30 Tab, Refills: 2    Associated Diagnoses: Anxiety and depression      simvastatin (ZOCOR) 40 mg tablet TAKE 1 TABLET BY MOUTH EVERY NIGHT  Qty: 90 Tab, Refills: 1    Associated Diagnoses: Mixed hyperlipidemia      levothyroxine (SYNTHROID) 100 mcg tablet TAKE 1 TABLET BY MOUTH DAILY BEFORE BREAKFAST  Qty: 90 Tab, Refills: 1    Associated Diagnoses: Anxiety and depression; Hypothyroidism, unspecified type      lactase (LACTAID) 3,000 unit tablet Pt may use up to three times daily as needed with meals. Qty: 90 Tab, Refills: 5      furosemide (LASIX) 20 mg tablet TAKE 2 TABLET BY MOUTH DAILY UNTIL SWELLING IS GONE FROM BOTH LEGS  Qty: 180 Tab, Refills: 1    Comments: **Patient requests 90 days supply**  Associated Diagnoses: Essential hypertension with goal blood pressure less than 130/85; Localized edema      acetaminophen (TYLENOL) 500 mg tablet Take 2 Tabs by mouth every six (6) hours.   Qty: 120 Tab, Refills: 0      senna-docusate (PERICOLACE) 8.6-50 mg per tablet Take 2 Tabs by mouth daily.  Qty: 120 Tab, Refills: 0      sertraline (ZOLOFT) 100 mg tablet Take 1 Tab by mouth daily. Qty: 90 Tab, Refills: 1    Associated Diagnoses: Anxiety and depression      omeprazole (PRILOSEC) 40 mg capsule Take 1 Cap by mouth daily. Qty: 90 Cap, Refills: 1    Associated Diagnoses: Gastroesophageal reflux disease without esophagitis      losartan-hydroCHLOROthiazide (HYZAAR) 50-12.5 mg per tablet Take 1 Tab by mouth daily. Qty: 90 Tab, Refills: 1    Associated Diagnoses: Essential hypertension with goal blood pressure less than 130/85; Localized edema; Diabetes mellitus without complication ([facility] [address]      ! ! miscellaneous medical supply misc Wheelchair, mechanical for ambulation, hip pain, poor ambulation  Qty: 1 Each, Refills: 0    Associated Diagnoses: Poor tolerance for ambulation      !! miscellaneous medical supply [place] Handicap Placard - use for poor ambulation. Qty: 1 Each, Refills: 0    Associated Diagnoses: Poor tolerance for ambulation      polyethylene glycol (MIRALAX) 17 gram/dose powder Take 17 g by mouth daily. 1 tablespoon with 8 oz of water daily  Qty: 238 g, Refills: 0      potassium chloride (KLOR-CON) 10 mEq tablet TAKE 1 TABLET BY MOUTH TWICE DAILY ONLY WHEN TAKING LASIX  Qty: 180 Tab, Refills: 5    Comments: **Patient requests 90 days supply**  Associated Diagnoses: Essential hypertension with goal blood pressure less than 130/85; Localized edema      !! miscellaneous medical supply misc Zippered pair of compression stocking for lower extremity edema. Pt says cannot get on other type. (1 pair)  Qty: 1 Each, Refills: 0    Associated Diagnoses: Edema, unspecified type      cyanocobalamin (VITAMIN B12) 500 mcg tablet Take 500 mcg by mouth daily. Associated Diagnoses: Vitamin B 12 deficiency       ! ! - Potential duplicate medications found. Please discuss with provider.           Activity: PT/OT per Home Health  Diet: Regular Diet    Follow-up      Follow-up Appointments   Procedures    FOLLOW UP VISIT Appointment in: Other (Specify) 1) Dr. Haim Mcmillan (Neurosurgery) in 4 weeks for recheck of intracranial hemorrhage and for MRI to rule out metastatic disease to the brain. 2) PCP within 1 week for hospital follow up. 1) Dr. Haim Mcmillan (Neurosurgery) in 4 weeks for recheck of intracranial hemorrhage and for MRI to rule out metastatic disease to the brain. 2) PCP within 1 week for hospital follow up. Standing Status:   Standing     Number of Occurrences:   1     Order Specific Question:   Appointment in     Answer: Other (Specify)   ·   ·   Time spent to discharge patient 28 minutes  Signed:  Veronica Gaviria.  Ailyn Harman MD  1/8/2019  12:13 PM

## 2019-01-09 ENCOUNTER — PATIENT OUTREACH (OUTPATIENT)
Dept: CASE MANAGEMENT | Age: 84
End: 2019-01-09

## 2019-01-09 NOTE — PROGRESS NOTES
This note will not be viewable in [address] Transition of Care Discharge Follow-up Questionnaire Date/Time of Call: 
 1/9/19 10:05am  
What was the patient hospitalized for? Traumatic intracranial hemorrhage without loss of consciousness Does the patient understand his/her diagnosis and/or treatment and what happened during the hospitalization? Yes, spoke with patients granddaughter, Jaydon Kim, she states her understanding of diagnosis and treatment; and is agreeable to call. Jaydon Kim states patient has been doing well since being home Did the patient receive discharge instructions? Yes   
CM Assessed Risk for Readmission:  
 
 
Patient stated Risk for Readmission: Low r/t diagnosis  
 
 
weakness Review any discharge instructions (see discharge instructions/AVS in [facility]). Ask patient if they understand these. Do they have any questions? Reviewed, understanding is stated, no questions at this time Were home services ordered (nursing, PT, OT, ST, etc.)? Yes, [facility] PT If so, has the first visit occurred? If not, why? (Assist with coordination of services if necessary.) Scheduled for tomorrow Was any DME ordered? No 
Patient has cane and walker at home If so, has it been received? If not, why?  (Assist patient in obtaining DME orders &/or equipment if necessary.) N/A Complete a review of all medications (new, continued and discontinued meds per the D/C instructions and medication tab in [facility]). Completed START taking: 
levETIRAcetam 500 mg tablet (KEPPRA) Were all new prescriptions filled? If not, why?  (Assist patient in obtaining medications if necessary  escalate for CCM &/or SW if ongoing issues are verbalized by pt or anticipated) Yes Does the patient understand the purpose and dosing instructions for all medications? (If patient has questions, provide explanation and education.) Yes Does the patient have any problems in performing ADLs? (If patient is unable to perform ADLs  what is the limiting factor(s)? Do they have a support system that can assist? If no support system is present, discuss possible assistance that they may be able to obtain. Escalate for CCM/SW if ongoing issues are verbalized by pt or anticipated) Independent with [facility] Seat is close by and checks in frequently Does the patient have all follow-up appointments scheduled? 7 day f/up with PCP?  
(f/up with PCP may be w/in 14 days if patient has a f/up with their specialist w/in 7 days) 7-14 day f/up with specialist?  
(or per discharge instructions) If f/up has not been made  what actions has the care coordinator made to accomplish this? Has transportation been arranged? Yes Dr. Tyson Rivera PCP 1/15/19 Dr. De Luna [facility] Neurology 2/6/19 MRI 1/22/19 Yes, no transportation needs were identified at this time Any other questions or concerns expressed by the patient? No further needs or concerns identified. Quitamelly Smooth states her gratitude for follow up. Contact information for [facility] was given, instructed to call with new questions or concerns. Schedule next appointment with KAM RAYMOND Coordinator or refer to RN Case Manager/ per the workflow guidelines. When is care coordinators next follow-up call scheduled? If referred for CCM  what RN care manager was the referral assigned? Community Care Coordinator will follow per workflow guidelines. 3 to 4 weeks MARIAM Call Completed By: Jonn Knox LPN Community Care Coordinator

## 2019-01-10 ENCOUNTER — HOME CARE VISIT (OUTPATIENT)
Dept: SCHEDULING | Facility: HOME HEALTH | Age: 84
End: 2019-01-10
Payer: MEDICARE

## 2019-01-10 VITALS
SYSTOLIC BLOOD PRESSURE: 120 MMHG | RESPIRATION RATE: 18 BRPM | HEART RATE: 72 BPM | TEMPERATURE: 98.4 F | DIASTOLIC BLOOD PRESSURE: 72 MMHG

## 2019-01-10 PROCEDURE — 400013 HH SOC

## 2019-01-10 PROCEDURE — G0151 HHCP-SERV OF PT,EA 15 MIN: HCPCS

## 2019-01-10 PROCEDURE — 3331090002 HH PPS REVENUE DEBIT

## 2019-01-10 PROCEDURE — 3331090001 HH PPS REVENUE CREDIT

## 2019-01-11 ENCOUNTER — HOME CARE VISIT (OUTPATIENT)
Dept: SCHEDULING | Facility: HOME HEALTH | Age: 84
End: 2019-01-11
Payer: MEDICARE

## 2019-01-11 PROCEDURE — G0157 HHC PT ASSISTANT EA 15: HCPCS

## 2019-01-11 PROCEDURE — 3331090002 HH PPS REVENUE DEBIT

## 2019-01-11 PROCEDURE — 3331090001 HH PPS REVENUE CREDIT

## 2019-01-12 PROCEDURE — 3331090001 HH PPS REVENUE CREDIT

## 2019-01-12 PROCEDURE — 3331090002 HH PPS REVENUE DEBIT

## 2019-01-13 PROCEDURE — 3331090001 HH PPS REVENUE CREDIT

## 2019-01-13 PROCEDURE — 3331090002 HH PPS REVENUE DEBIT

## 2019-01-14 ENCOUNTER — HOME CARE VISIT (OUTPATIENT)
Dept: SCHEDULING | Facility: HOME HEALTH | Age: 84
End: 2019-01-14
Payer: MEDICARE

## 2019-01-14 VITALS
SYSTOLIC BLOOD PRESSURE: 124 MMHG | TEMPERATURE: 96.7 F | DIASTOLIC BLOOD PRESSURE: 64 MMHG | RESPIRATION RATE: 17 BRPM | HEART RATE: 71 BPM

## 2019-01-14 PROCEDURE — G0152 HHCP-SERV OF OT,EA 15 MIN: HCPCS

## 2019-01-14 PROCEDURE — 3331090001 HH PPS REVENUE CREDIT

## 2019-01-14 PROCEDURE — G0299 HHS/HOSPICE OF RN EA 15 MIN: HCPCS

## 2019-01-14 PROCEDURE — G0157 HHC PT ASSISTANT EA 15: HCPCS

## 2019-01-14 PROCEDURE — 3331090002 HH PPS REVENUE DEBIT

## 2019-01-15 VITALS
RESPIRATION RATE: 18 BRPM | DIASTOLIC BLOOD PRESSURE: 64 MMHG | HEART RATE: 88 BPM | TEMPERATURE: 97.9 F | OXYGEN SATURATION: 97 % | SYSTOLIC BLOOD PRESSURE: 118 MMHG

## 2019-01-15 PROCEDURE — 3331090001 HH PPS REVENUE CREDIT

## 2019-01-15 PROCEDURE — 3331090002 HH PPS REVENUE DEBIT

## 2019-01-16 ENCOUNTER — HOME CARE VISIT (OUTPATIENT)
Dept: SCHEDULING | Facility: HOME HEALTH | Age: 84
End: 2019-01-16
Payer: MEDICARE

## 2019-01-16 VITALS
RESPIRATION RATE: 16 BRPM | SYSTOLIC BLOOD PRESSURE: 130 MMHG | DIASTOLIC BLOOD PRESSURE: 78 MMHG | TEMPERATURE: 98.4 F | HEART RATE: 68 BPM

## 2019-01-16 PROCEDURE — 3331090002 HH PPS REVENUE DEBIT

## 2019-01-16 PROCEDURE — 3331090001 HH PPS REVENUE CREDIT

## 2019-01-16 PROCEDURE — G0158 HHC OT ASSISTANT EA 15: HCPCS

## 2019-01-17 ENCOUNTER — HOME CARE VISIT (OUTPATIENT)
Dept: SCHEDULING | Facility: HOME HEALTH | Age: 84
End: 2019-01-17
Payer: MEDICARE

## 2019-01-17 VITALS
HEART RATE: 73 BPM | OXYGEN SATURATION: 98 % | DIASTOLIC BLOOD PRESSURE: 78 MMHG | RESPIRATION RATE: 20 BRPM | TEMPERATURE: 98.3 F | SYSTOLIC BLOOD PRESSURE: 114 MMHG

## 2019-01-17 PROCEDURE — 3331090002 HH PPS REVENUE DEBIT

## 2019-01-17 PROCEDURE — G0299 HHS/HOSPICE OF RN EA 15 MIN: HCPCS

## 2019-01-17 PROCEDURE — 3331090001 HH PPS REVENUE CREDIT

## 2019-01-18 ENCOUNTER — HOME CARE VISIT (OUTPATIENT)
Dept: SCHEDULING | Facility: HOME HEALTH | Age: 84
End: 2019-01-18
Payer: MEDICARE

## 2019-01-18 VITALS
HEART RATE: 82 BPM | SYSTOLIC BLOOD PRESSURE: 146 MMHG | RESPIRATION RATE: 20 BRPM | DIASTOLIC BLOOD PRESSURE: 86 MMHG | TEMPERATURE: 97.5 F

## 2019-01-18 PROCEDURE — 3331090001 HH PPS REVENUE CREDIT

## 2019-01-18 PROCEDURE — G0157 HHC PT ASSISTANT EA 15: HCPCS

## 2019-01-18 PROCEDURE — 3331090002 HH PPS REVENUE DEBIT

## 2019-01-19 PROCEDURE — 3331090001 HH PPS REVENUE CREDIT

## 2019-01-19 PROCEDURE — 3331090002 HH PPS REVENUE DEBIT

## 2019-01-20 VITALS
DIASTOLIC BLOOD PRESSURE: 78 MMHG | TEMPERATURE: 96.9 F | HEART RATE: 82 BPM | RESPIRATION RATE: 18 BRPM | SYSTOLIC BLOOD PRESSURE: 142 MMHG

## 2019-01-20 PROCEDURE — 3331090002 HH PPS REVENUE DEBIT

## 2019-01-20 PROCEDURE — 3331090001 HH PPS REVENUE CREDIT

## 2019-01-21 ENCOUNTER — HOME CARE VISIT (OUTPATIENT)
Dept: SCHEDULING | Facility: HOME HEALTH | Age: 84
End: 2019-01-21
Payer: MEDICARE

## 2019-01-21 PROCEDURE — 3331090002 HH PPS REVENUE DEBIT

## 2019-01-21 PROCEDURE — 3331090001 HH PPS REVENUE CREDIT

## 2019-01-21 PROCEDURE — G0158 HHC OT ASSISTANT EA 15: HCPCS

## 2019-01-22 ENCOUNTER — HOME CARE VISIT (OUTPATIENT)
Dept: SCHEDULING | Facility: HOME HEALTH | Age: 84
End: 2019-01-22
Payer: MEDICARE

## 2019-01-22 ENCOUNTER — HOSPITAL ENCOUNTER (OUTPATIENT)
Dept: MRI IMAGING | Age: 84
Discharge: HOME | End: 2019-01-22
Attending: NEUROLOGICAL SURGERY
Payer: MEDICARE

## 2019-01-22 VITALS
RESPIRATION RATE: 14 BRPM | SYSTOLIC BLOOD PRESSURE: 124 MMHG | TEMPERATURE: 98 F | DIASTOLIC BLOOD PRESSURE: 75 MMHG | HEART RATE: 70 BPM

## 2019-01-22 VITALS
SYSTOLIC BLOOD PRESSURE: 118 MMHG | HEART RATE: 69 BPM | TEMPERATURE: 97.8 F | RESPIRATION RATE: 20 BRPM | DIASTOLIC BLOOD PRESSURE: 64 MMHG

## 2019-01-22 DIAGNOSIS — I62.9 INTRACRANIAL HEMORRHAGE (HCC): ICD-10-CM

## 2019-01-22 PROCEDURE — 3331090001 HH PPS REVENUE CREDIT

## 2019-01-22 PROCEDURE — 70553 MRI BRAIN STEM W/O & W/DYE: CPT

## 2019-01-22 PROCEDURE — 3331090002 HH PPS REVENUE DEBIT

## 2019-01-22 PROCEDURE — A9575 INJ GADOTERATE MEGLUMI 0.1ML: HCPCS | Performed by: NEUROLOGICAL SURGERY

## 2019-01-22 PROCEDURE — G0299 HHS/HOSPICE OF RN EA 15 MIN: HCPCS

## 2019-01-22 PROCEDURE — 74011250636 HC RX REV CODE- 250/636: Performed by: NEUROLOGICAL SURGERY

## 2019-01-22 PROCEDURE — G0157 HHC PT ASSISTANT EA 15: HCPCS

## 2019-01-22 RX ORDER — GADOTERATE MEGLUMINE 376.9 MG/ML
12 INJECTION INTRAVENOUS
Status: COMPLETED | OUTPATIENT
Start: 2019-01-22 | End: 2019-01-22

## 2019-01-22 RX ORDER — SODIUM CHLORIDE 0.9 % (FLUSH) 0.9 %
10 SYRINGE (ML) INJECTION
Status: COMPLETED | OUTPATIENT
Start: 2019-01-22 | End: 2019-01-22

## 2019-01-22 RX ADMIN — Medication 10 ML: at 15:55

## 2019-01-22 RX ADMIN — GADOTERATE MEGLUMINE 12 ML: 376.9 INJECTION INTRAVENOUS at 15:55

## 2019-01-23 ENCOUNTER — HOME CARE VISIT (OUTPATIENT)
Dept: SCHEDULING | Facility: HOME HEALTH | Age: 84
End: 2019-01-23
Payer: MEDICARE

## 2019-01-23 VITALS
RESPIRATION RATE: 18 BRPM | TEMPERATURE: 97.2 F | HEART RATE: 82 BPM | SYSTOLIC BLOOD PRESSURE: 135 MMHG | DIASTOLIC BLOOD PRESSURE: 82 MMHG

## 2019-01-23 VITALS — HEART RATE: 70 BPM | TEMPERATURE: 96.7 F | SYSTOLIC BLOOD PRESSURE: 126 MMHG | DIASTOLIC BLOOD PRESSURE: 66 MMHG

## 2019-01-23 PROCEDURE — 3331090001 HH PPS REVENUE CREDIT

## 2019-01-23 PROCEDURE — G0152 HHCP-SERV OF OT,EA 15 MIN: HCPCS

## 2019-01-23 PROCEDURE — 3331090002 HH PPS REVENUE DEBIT

## 2019-01-24 ENCOUNTER — HOME CARE VISIT (OUTPATIENT)
Dept: HOME HEALTH SERVICES | Facility: HOME HEALTH | Age: 84
End: 2019-01-24
Payer: MEDICARE

## 2019-01-24 VITALS
TEMPERATURE: 97.2 F | DIASTOLIC BLOOD PRESSURE: 83 MMHG | SYSTOLIC BLOOD PRESSURE: 141 MMHG | HEART RATE: 82 BPM | RESPIRATION RATE: 20 BRPM

## 2019-01-24 PROCEDURE — 3331090001 HH PPS REVENUE CREDIT

## 2019-01-24 PROCEDURE — 3331090002 HH PPS REVENUE DEBIT

## 2019-01-25 ENCOUNTER — HOME CARE VISIT (OUTPATIENT)
Dept: SCHEDULING | Facility: HOME HEALTH | Age: 84
End: 2019-01-25
Payer: MEDICARE

## 2019-01-25 VITALS
HEART RATE: 74 BPM | TEMPERATURE: 98.4 F | RESPIRATION RATE: 18 BRPM | DIASTOLIC BLOOD PRESSURE: 64 MMHG | SYSTOLIC BLOOD PRESSURE: 116 MMHG

## 2019-01-25 PROCEDURE — G0157 HHC PT ASSISTANT EA 15: HCPCS

## 2019-01-25 PROCEDURE — 3331090001 HH PPS REVENUE CREDIT

## 2019-01-25 PROCEDURE — G0299 HHS/HOSPICE OF RN EA 15 MIN: HCPCS

## 2019-01-25 PROCEDURE — 3331090002 HH PPS REVENUE DEBIT

## 2019-01-26 PROCEDURE — 3331090001 HH PPS REVENUE CREDIT

## 2019-01-26 PROCEDURE — 3331090002 HH PPS REVENUE DEBIT

## 2019-01-27 PROCEDURE — 3331090002 HH PPS REVENUE DEBIT

## 2019-01-27 PROCEDURE — 3331090001 HH PPS REVENUE CREDIT

## 2019-01-28 PROCEDURE — 3331090001 HH PPS REVENUE CREDIT

## 2019-01-28 PROCEDURE — 3331090002 HH PPS REVENUE DEBIT

## 2019-01-29 ENCOUNTER — HOME CARE VISIT (OUTPATIENT)
Dept: SCHEDULING | Facility: HOME HEALTH | Age: 84
End: 2019-01-29
Payer: MEDICARE

## 2019-01-29 PROCEDURE — G0157 HHC PT ASSISTANT EA 15: HCPCS

## 2019-01-29 PROCEDURE — 3331090001 HH PPS REVENUE CREDIT

## 2019-01-29 PROCEDURE — 3331090002 HH PPS REVENUE DEBIT

## 2019-01-30 VITALS
DIASTOLIC BLOOD PRESSURE: 79 MMHG | HEART RATE: 78 BPM | RESPIRATION RATE: 18 BRPM | SYSTOLIC BLOOD PRESSURE: 129 MMHG | TEMPERATURE: 97.4 F

## 2019-01-30 PROCEDURE — 3331090001 HH PPS REVENUE CREDIT

## 2019-01-30 PROCEDURE — 3331090002 HH PPS REVENUE DEBIT

## 2019-01-31 VITALS
HEART RATE: 82 BPM | RESPIRATION RATE: 18 BRPM | DIASTOLIC BLOOD PRESSURE: 73 MMHG | SYSTOLIC BLOOD PRESSURE: 133 MMHG | TEMPERATURE: 98.1 F

## 2019-01-31 PROCEDURE — 3331090002 HH PPS REVENUE DEBIT

## 2019-01-31 PROCEDURE — 3331090001 HH PPS REVENUE CREDIT

## 2019-02-01 ENCOUNTER — PATIENT OUTREACH (OUTPATIENT)
Dept: CASE MANAGEMENT | Age: 84
End: 2019-02-01

## 2019-02-01 ENCOUNTER — HOME CARE VISIT (OUTPATIENT)
Dept: SCHEDULING | Facility: HOME HEALTH | Age: 84
End: 2019-02-01
Payer: MEDICARE

## 2019-02-01 VITALS
TEMPERATURE: 98.2 F | DIASTOLIC BLOOD PRESSURE: 62 MMHG | RESPIRATION RATE: 18 BRPM | OXYGEN SATURATION: 98 % | SYSTOLIC BLOOD PRESSURE: 100 MMHG | HEART RATE: 78 BPM

## 2019-02-01 PROCEDURE — G0151 HHCP-SERV OF PT,EA 15 MIN: HCPCS

## 2019-02-01 PROCEDURE — 3331090002 HH PPS REVENUE DEBIT

## 2019-02-01 PROCEDURE — 3331090001 HH PPS REVENUE CREDIT

## 2019-02-01 NOTE — PROGRESS NOTES
This note will not be viewable in [address] Transitions of Care  Follow up Outreach Note Outreach type Phone call: spoke with patients granddaughter, Paul Crenshaw Home visit:  
Date/Time of Outreach: 2/1/19  3:10pm  
 
Has patient attended PCP or specialist follow-up appointments since last contact? What was outcome of appointment? When is next follow-up scheduled? Paul Crenshaw states patient is doing well. Follow up with Dr. Gigi Corrales with good outcome, next visit 4/16/19. Neurology appointment remains 2/6/19. Review medications. Any medication changes since last outreach? Does patient have any questions or issues related to their medications? No 
 
No  
 
Home health active? If yes  any issue? Progress? No, [facility] graduated patient Referrals needed? 
(CM, SW, HH, etc. ) No   
Other issues/Miscellaneous? (Transportation, access to meals, ability to perform ADLs, adequate caregiver support, etc.) No other needs or concerns at this time. Paul Crenshaw states her gratitude for follow up. Next Outreach Scheduled? Graduation from program? 
 N/A Yes Next Steps/Goals (if applicable): N/A Outreach completed by: 
 Ludmila Sexton LPN Community Care Coordinator

## 2019-02-02 PROCEDURE — 3331090002 HH PPS REVENUE DEBIT

## 2019-02-02 PROCEDURE — 3331090001 HH PPS REVENUE CREDIT

## 2019-02-03 PROCEDURE — 3331090001 HH PPS REVENUE CREDIT

## 2019-02-03 PROCEDURE — 3331090002 HH PPS REVENUE DEBIT

## 2019-10-08 PROBLEM — E11.9 DIABETES MELLITUS WITHOUT COMPLICATION (HCC): Status: RESOLVED | Noted: 2017-11-03 | Resolved: 2019-10-08

## 2020-01-10 ENCOUNTER — PATIENT OUTREACH (OUTPATIENT)
Dept: CASE MANAGEMENT | Age: 85
End: 2020-01-10

## 2020-01-10 NOTE — PROGRESS NOTES
GAB SEGAL received a referral to reach out to family regarding any community resources that may be available for this pt. GAB SEGAL reviewed chart and left VM with pt's dx Morteza Small [phone]. Awaiting call back. This note will not be viewable in [address]

## 2020-01-14 ENCOUNTER — PATIENT OUTREACH (OUTPATIENT)
Dept: CASE MANAGEMENT | Age: 85
End: 2020-01-14

## 2020-01-14 NOTE — PROGRESS NOTES
SW CM left 2nd  for pt's dx regarding a need for community resources to assist in keeping pt at home. Awaiting call back. This note will not be viewable in [address]

## 2020-01-20 ENCOUNTER — PATIENT OUTREACH (OUTPATIENT)
Dept: CASE MANAGEMENT | Age: 85
End: 2020-01-20

## 2020-01-20 NOTE — PROGRESS NOTES
Ambulatory Care Coordination  Social Work Assessment   Referral from which SORAYA CM: Alex Mcgregor   Previously referred? If so, reason and brief outcome No   Reason for current referral: Needs in home resources   Income information (if needed): Not provided   Sources of Support: Roque Bernard [phone]   ACP set up? Needs information? No   Medication Cost assistance needed? No   Referral to CLTC/Medicaid needed? Matt Hogue not qualify at this time   Referral to [facility] Extra Help/LIS program needed? No   Small home repair needed? No   MOW referral? No   Any other concerns/questions? Granddaughter inquiring about any assistance in the home for her grandmother who lives with spouse next door to granddaughter. She already has an aide and RN through Hospice but wants an RN that will stay overnight. Next steps: GAB SEGAL inquired about sitter services. Family qualified for caregiver waiver through [employer] but would like more. Because pt does not have Medicaid, in home care beyond HH/Hospice is not covered. GAB SEGAL and Stuart Dumont discussed privately paying a family friend or Scientologist friend to stay with pt. Options are limited if not using an agency. GAB SEGAL will remain available for another month if any questions arise. This note will not be viewable in [address]

## 2020-01-23 ENCOUNTER — PATIENT OUTREACH (OUTPATIENT)
Dept: CASE MANAGEMENT | Age: 85
End: 2020-01-23

## 2020-01-23 NOTE — PROGRESS NOTES
GAB SEGAL received call from pt's granddaughter, stating that pt has passed away. This note will not be viewable in [address]

## 2020-01-28 NOTE — PERIOP NOTES
Pt presents to the Ed for Cc of LLQ abdominal pain onset x one week ago with significant increase in pain x two days. Pt has a hx of ovarian cyst one month ago on the right side. No vomiting, diarrhea, fevers, CP or SOB.    Teach back method used in review of Incentive Spirometer TOTAL TIDAL VOLUME 750 ML OBSERVED, Hibiclens usage preop, TB screening and pain management goals.

## 2023-01-12 NOTE — PROGRESS NOTES
Spoke with grand-daughter, Benjamin Pandya via phone s/p admission to ICU for intraparenchymal hemorrhage of brain from fall at [facility]. Pt is alert and oriented, does repeat some. GD confirms demographics. Pt lives with 81 y/o spouse. Both are independent, GD lives next door. Pt only son is . Uses cane and walker to ambulate with. The reason she did not have walker at [facility], they have not figured out how to fold and put in their [place] per GD. Explained that CM will continue to follow for any d/c needs. GD does not feel she will need anything. Questions answered regarding private sitters/caregivers and lifeline. Following for any needs per MD.  
 
Care Management Interventions PCP Verified by CM: Yes(confirms Dr. Edmundo Ann) Mode of Transport at Discharge: Other (see comment) Transition of Care Consult (CM Consult): Discharge Planning Discharge Durable Medical Equipment: (cane, walker) Current Support Network: Lives with Spouse, Own Home(lives with spouse, [facility] [phone] daughter ) Confirm Follow Up Transport: Family(Granddaughter provides transportation, spouse drives as well) Plan discussed with Pt/Family/Caregiver: Yes Freedom of Choice Offered: Yes [employer] Information Provided?: (confirms MCR/supplemental - able to get rx with drug plan) Discharge Location Discharge Placement: Unable to determine at this time solids

## 2024-02-29 NOTE — PERIOP NOTES
PT'S GRAND DAUGHTER MANJIT MAY BE REACHED -[phone]. Jasbir Munoz MD  Division of Hospital Medicine  Available on MS teams until 7pm  If no response or off-hours, page [phone]  -------------------------------------    Patient is a 89y old  Female who presents with a chief complaint of abd pain (28 Feb 2024 13:24)      SUBJECTIVE / OVERNIGHT EVENTS: none acute  ADDITIONAL REVIEW OF SYSTEMS: no new acute complaints- notes some intermittent back pain which has been improved with muscle relaxants.     MEDICATIONS  (STANDING):  albuterol/ipratropium for Nebulization 3 milliLiter(s) Nebulizer every 6 hours  amLODIPine   Tablet 2.5 milliGRAM(s) Oral daily  ferrous    sulfate 325 milliGRAM(s) Oral daily  fluticasone propionate 50 MICROgram(s)/spray Nasal Spray 1 Spray(s) Both Nostrils two times a day  gabapentin 100 milliGRAM(s) Oral every 8 hours  latanoprost 0.005% Ophthalmic Solution 1 Drop(s) Both EYES at bedtime  lidocaine   4% Patch 1 Patch Transdermal daily  meropenem  IVPB 500 milliGRAM(s) IV Intermittent every 12 hours  meropenem  IVPB      polyethylene glycol 3350 17 Gram(s) Oral daily  senna 2 Tablet(s) Oral at bedtime  sucralfate suspension 1 Gram(s) Oral <User Schedule>    MEDICATIONS  (PRN):  acetaminophen     Tablet .. 650 milliGRAM(s) Oral every 6 hours PRN Temp greater or equal to 38C (100.4F), Mild Pain (1 - 3)  aluminum hydroxide/magnesium hydroxide/simethicone Suspension 30 milliLiter(s) Oral every 4 hours PRN Dyspepsia  melatonin 3 milliGRAM(s) Oral at bedtime PRN Insomnia  ondansetron Injectable 4 milliGRAM(s) IV Push every 8 hours PRN Nausea and/or Vomiting  oxycodone    5 mG/acetaminophen 325 mG 2 Tablet(s) Oral every 12 hours PRN Moderate Pain (4 - 6)      CAPILLARY BLOOD GLUCOSE        I&O's Summary    28 Feb 2024 07:01  -  29 Feb 2024 07:00  --------------------------------------------------------  IN: 610 mL / OUT: 300 mL / NET: 310 mL        PHYSICAL EXAM:  Vital Signs Last 24 Hrs  T(C): 36.7 (29 Feb 2024 11:57), Max: 36.8 (28 Feb 2024 20:42)  T(F): 98.1 (29 Feb 2024 11:57), Max: 98.2 (28 Feb 2024 20:42)  HR: 53 (29 Feb 2024 11:57) (45 - 59)  BP: 133/77 (29 Feb 2024 11:57) (117/76 - 145/72)  BP(mean): --  RR: 18 (29 Feb 2024 11:57) (18 - 18)  SpO2: 92% (29 Feb 2024 11:57) (90% - 92%)    Parameters below as of 29 Feb 2024 11:57  Patient On (Oxygen Delivery Method): room air      CONSTITUTIONAL: NAD  EYES: PERRLA; conjunctiva and sclera clear  ENMT: MMM  NECK: Supple  RESPIRATORY: Normal respiratory effort; CTAB  CARDIOVASCULAR: RRR, no JVD, no peripheral edema   ABDOMEN: Nontender to palpation, normoactive BS, no guarding/rigidity  MUSCLOSKELETAL:  no clubbing/cyanosis, no joint swelling or tenderness to palpation  PSYCH: A+O x 2, affect anxious  NEUROLOGY: CN 2-12 are intact and symmetric; no gross sensory or motor deficits  SKIN: No rashes; no palpable lesions    LABS:                        7.9    8.28  )-----------( 254      ( 29 Feb 2024 07:08 )             26.0     02-29    144  |  112<H>  |  26<H>  ----------------------------<  94  3.6   |  19<L>  |  1.62<H>    Ca    9.3      29 Feb 2024 07:08            Urinalysis Basic - ( 29 Feb 2024 07:08 )    Color: x / Appearance: x / SG: x / pH: x  Gluc: 94 mg/dL / Ketone: x  / Bili: x / Urobili: x   Blood: x / Protein: x / Nitrite: x   Leuk Esterase: x / RBC: x / WBC x   Sq Epi: x / Non Sq Epi: x / Bacteria: x          RADIOLOGY & ADDITIONAL TESTS:  Results Reviewed:   Imaging Personally Reviewed:  Electrocardiogram Personally Reviewed:    COORDINATION OF CARE:  Care Discussed with Consultants/Other Providers [Y/N]: infectious diseases  Prior or Outpatient Records Reviewed [Y/N]:

## (undated) DEVICE — SUT PROL 3-0 18IN PS2 BLU --

## (undated) DEVICE — SOLUTION IV 1000ML 0.9% SOD CHL

## (undated) DEVICE — SURGICAL PROCEDURE PACK BASIC ST FRANCIS

## (undated) DEVICE — JELLY LUBRICATING 10GM PREFIL SYR LUBE

## (undated) DEVICE — AMD ANTIMICROBIAL NON-ADHERENT PAD,0.2% POLYHEXAMETHYLENE BIGUANIDE HCI (PHMB): Brand: TELFA

## (undated) DEVICE — APPLICATOR BNDG 1MM ADH PREMIERPRO EXOFIN

## (undated) DEVICE — 3M™ STERI-DRAPE™ INSTRUMENT POUCH 1018: Brand: STERI-DRAPE™

## (undated) DEVICE — (D)PREP SKN CHLRAPRP APPL 26ML -- CONVERT TO ITEM 371833

## (undated) DEVICE — ABDOMINAL PAD: Brand: DERMACEA

## (undated) DEVICE — NEEDLE SPNL 22GA L3.5IN BLK HUB S STL REG WALL FIT STYL W/

## (undated) DEVICE — STOCKINETTE TUBE 6X48 -- MEDICHOICE

## (undated) DEVICE — REM POLYHESIVE ADULT PATIENT RETURN ELECTRODE: Brand: VALLEYLAB

## (undated) DEVICE — BLADE ELECTRODE: Brand: VALLEYLAB

## (undated) DEVICE — SOLUTION IV DEXTROSE/SALINE 5%-0.9% 500ML - 500ML

## (undated) DEVICE — BUTTON SWITCH PENCIL BLADE ELECTRODE, HOLSTER: Brand: EDGE

## (undated) DEVICE — DRAPE, FILM SHEET, 44X65 STERILE: Brand: MEDLINE

## (undated) DEVICE — 3M™ STERI-DRAPE™ INCISE DRAPE, XL 1051: Brand: STERI-DRAPE™

## (undated) DEVICE — SYSTEM SKIN CLSR 22CM DERMBND PRINEO

## (undated) DEVICE — Device

## (undated) DEVICE — SUTURE MCRYL SZ 2-0 L27IN ABSRB UD CP-1 1 L36MM 1/2 CIR REV Y266H

## (undated) DEVICE — SYR 10ML LUER LOK 1/5ML GRAD --

## (undated) DEVICE — STRETCH BANDAGE ROLL: Brand: DERMACEA

## (undated) DEVICE — SUTURE VCRL RAPIDE SZ 4-0 L18IN ABSRB UD L19MM PS-2 1/2 CIR VR496

## (undated) DEVICE — INSTRUMENT ORTH L6IN LNG S STL SGL IN TOME

## (undated) DEVICE — TRAY PREP DRY W/ PREM GLV 2 APPL 6 SPNG 2 UNDPD 1 OVERWRAP

## (undated) DEVICE — Z DISCONTINUED USE 2423037 APPLICATOR MEDICATED 3 CC CHLORHEXIDINE GLUC 2% CHLORAPREP

## (undated) DEVICE — ZIMMER® STERILE DISPOSABLE TOURNIQUET CUFF, DUAL PORT, SINGLE BLADDER, 12 IN. (30 CM)

## (undated) DEVICE — MEDI-VAC YANKAUER SUCTION HANDLE W/BULBOUS TIP: Brand: CARDINAL HEALTH

## (undated) DEVICE — DRAPE,HIP,W/POUCHES,STERILE: Brand: MEDLINE

## (undated) DEVICE — SUTURE FIBERWIRE SZ 2 W/ TAPERED NEEDLE BLUE L38IN NONABSORB BLU L26.5MM 1/2 CIRCLE AR7200

## (undated) DEVICE — 2000CC GUARDIAN II: Brand: GUARDIAN

## (undated) DEVICE — CONTAINER,SPECIMEN,O.R.STRL,4.5OZ: Brand: MEDLINE

## (undated) DEVICE — HEWSON SUTURE RETRIEVER: Brand: HEWSON SUTURE RETRIEVER

## (undated) DEVICE — 3000CC GUARDIAN II: Brand: GUARDIAN

## (undated) DEVICE — MCLASS OSCILLATING SAW BLADE 19 X 1.27 (0.050") X 90 MM: Brand: MCLASS

## (undated) DEVICE — NEEDLE HYPO 21GA L1.5IN INTRAMUSCULAR S STL LATCH BVL UP

## (undated) DEVICE — FAN SPRAY KIT: Brand: PULSAVAC®

## (undated) DEVICE — T4 HOOD

## (undated) DEVICE — TRAY CATH 16F DRN BG LTX -- CONVERT TO ITEM 363158

## (undated) DEVICE — 1840 FOAM BLOCK NEEDLE COUNTER: Brand: DEVON

## (undated) DEVICE — 1010 S-DRAPE TOWEL DRAPE 10/BX: Brand: STERI-DRAPE™

## (undated) DEVICE — SUTURE STRATAFIX SZ 3-0 L30CM NONABSORBABLE UD L19MM FS-2 SXMP2B408

## (undated) DEVICE — GOWN,AURORA,FABRIC-REINFORCED,2XL: Brand: MEDLINE

## (undated) DEVICE — SOLUTION IRRIG 3000ML 0.9% SOD CHL FLX CONT 0797208] ICU MEDICAL INC]

## (undated) DEVICE — SUTURE STRATAFIX SPRL SZ 1 L5IN ABSRB VLT CT-1 L36MM 1/2 SXPD2B401

## (undated) DEVICE — AMD ANTIMICROBIAL GAUZE SPONGES,12 PLY USP TYPE VII, 0.2% POLYHEXAMETHYLENE BIGUANIDE HCI (PHMB): Brand: CURITY

## (undated) DEVICE — SURGICAL PROCEDURE PACK TOT HIP CDS

## (undated) DEVICE — BIPOLAR SEALER 23-112-1 AQM 6.0: Brand: AQUAMANTYS ®

## (undated) DEVICE — DRAPE,HAND,STERILE: Brand: MEDLINE

## (undated) DEVICE — DRAPE,U/SHT,SPLIT,FILM,60X84,STERILE: Brand: MEDLINE